# Patient Record
Sex: MALE | Race: WHITE | NOT HISPANIC OR LATINO | URBAN - METROPOLITAN AREA
[De-identification: names, ages, dates, MRNs, and addresses within clinical notes are randomized per-mention and may not be internally consistent; named-entity substitution may affect disease eponyms.]

---

## 2021-06-01 ENCOUNTER — INPATIENT (INPATIENT)
Facility: HOSPITAL | Age: 36
LOS: 9 days | Discharge: HOME | DRG: 177 | End: 2021-06-11
Attending: FAMILY MEDICINE | Admitting: INTERNAL MEDICINE
Payer: MEDICAID

## 2021-06-01 VITALS
HEART RATE: 115 BPM | WEIGHT: 315 LBS | OXYGEN SATURATION: 93 % | TEMPERATURE: 98 F | DIASTOLIC BLOOD PRESSURE: 100 MMHG | RESPIRATION RATE: 22 BRPM | HEIGHT: 70 IN | SYSTOLIC BLOOD PRESSURE: 159 MMHG

## 2021-06-01 DIAGNOSIS — U07.1 COVID-19: ICD-10-CM

## 2021-06-01 LAB
ALBUMIN SERPL ELPH-MCNC: 3.4 G/DL — SIGNIFICANT CHANGE UP (ref 3.3–5.2)
ALP SERPL-CCNC: 88 U/L — SIGNIFICANT CHANGE UP (ref 40–120)
ALT FLD-CCNC: 155 U/L — HIGH
ANION GAP SERPL CALC-SCNC: 13 MMOL/L — SIGNIFICANT CHANGE UP (ref 5–17)
APTT BLD: 26.7 SEC — LOW (ref 27.5–35.5)
AST SERPL-CCNC: 77 U/L — HIGH
BASOPHILS # BLD AUTO: 0.06 K/UL — SIGNIFICANT CHANGE UP (ref 0–0.2)
BASOPHILS NFR BLD AUTO: 0.6 % — SIGNIFICANT CHANGE UP (ref 0–2)
BILIRUB SERPL-MCNC: 1.3 MG/DL — SIGNIFICANT CHANGE UP (ref 0.4–2)
BUN SERPL-MCNC: 11.6 MG/DL — SIGNIFICANT CHANGE UP (ref 8–20)
CALCIUM SERPL-MCNC: 8.7 MG/DL — SIGNIFICANT CHANGE UP (ref 8.6–10.2)
CHLORIDE SERPL-SCNC: 89 MMOL/L — LOW (ref 98–107)
CO2 SERPL-SCNC: 25 MMOL/L — SIGNIFICANT CHANGE UP (ref 22–29)
CREAT SERPL-MCNC: 0.67 MG/DL — SIGNIFICANT CHANGE UP (ref 0.5–1.3)
CRP SERPL-MCNC: 57 MG/L — HIGH
EOSINOPHIL # BLD AUTO: 0.07 K/UL — SIGNIFICANT CHANGE UP (ref 0–0.5)
EOSINOPHIL NFR BLD AUTO: 0.8 % — SIGNIFICANT CHANGE UP (ref 0–6)
FERRITIN SERPL-MCNC: 1238 NG/ML — HIGH (ref 30–400)
GLUCOSE SERPL-MCNC: 110 MG/DL — HIGH (ref 70–99)
HCT VFR BLD CALC: 39.2 % — SIGNIFICANT CHANGE UP (ref 39–50)
HGB BLD-MCNC: 13.6 G/DL — SIGNIFICANT CHANGE UP (ref 13–17)
IMM GRANULOCYTES NFR BLD AUTO: 0.6 % — SIGNIFICANT CHANGE UP (ref 0–1.5)
INR BLD: 1.4 RATIO — HIGH (ref 0.88–1.16)
LYMPHOCYTES # BLD AUTO: 2.12 K/UL — SIGNIFICANT CHANGE UP (ref 1–3.3)
LYMPHOCYTES # BLD AUTO: 22.7 % — SIGNIFICANT CHANGE UP (ref 13–44)
MCHC RBC-ENTMCNC: 30.7 PG — SIGNIFICANT CHANGE UP (ref 27–34)
MCHC RBC-ENTMCNC: 34.7 GM/DL — SIGNIFICANT CHANGE UP (ref 32–36)
MCV RBC AUTO: 88.5 FL — SIGNIFICANT CHANGE UP (ref 80–100)
MONOCYTES # BLD AUTO: 1.14 K/UL — HIGH (ref 0–0.9)
MONOCYTES NFR BLD AUTO: 12.2 % — SIGNIFICANT CHANGE UP (ref 2–14)
NEUTROPHILS # BLD AUTO: 5.87 K/UL — SIGNIFICANT CHANGE UP (ref 1.8–7.4)
NEUTROPHILS NFR BLD AUTO: 63.1 % — SIGNIFICANT CHANGE UP (ref 43–77)
NT-PROBNP SERPL-SCNC: 74 PG/ML — SIGNIFICANT CHANGE UP (ref 0–300)
PLATELET # BLD AUTO: 605 K/UL — HIGH (ref 150–400)
POTASSIUM SERPL-MCNC: 3.9 MMOL/L — SIGNIFICANT CHANGE UP (ref 3.5–5.3)
POTASSIUM SERPL-SCNC: 3.9 MMOL/L — SIGNIFICANT CHANGE UP (ref 3.5–5.3)
PROCALCITONIN SERPL-MCNC: 0.14 NG/ML — HIGH (ref 0.02–0.1)
PROT SERPL-MCNC: 7.3 G/DL — SIGNIFICANT CHANGE UP (ref 6.6–8.7)
PROTHROM AB SERPL-ACNC: 16 SEC — HIGH (ref 10.6–13.6)
RBC # BLD: 4.43 M/UL — SIGNIFICANT CHANGE UP (ref 4.2–5.8)
RBC # FLD: 11.5 % — SIGNIFICANT CHANGE UP (ref 10.3–14.5)
SARS-COV-2 RNA SPEC QL NAA+PROBE: DETECTED
SODIUM SERPL-SCNC: 127 MMOL/L — LOW (ref 135–145)
TROPONIN T SERPL-MCNC: <0.01 NG/ML — SIGNIFICANT CHANGE UP (ref 0–0.06)
WBC # BLD: 9.32 K/UL — SIGNIFICANT CHANGE UP (ref 3.8–10.5)
WBC # FLD AUTO: 9.32 K/UL — SIGNIFICANT CHANGE UP (ref 3.8–10.5)

## 2021-06-01 PROCEDURE — 71045 X-RAY EXAM CHEST 1 VIEW: CPT | Mod: 26

## 2021-06-01 PROCEDURE — 99285 EMERGENCY DEPT VISIT HI MDM: CPT

## 2021-06-01 PROCEDURE — 71275 CT ANGIOGRAPHY CHEST: CPT | Mod: 26,MD

## 2021-06-01 PROCEDURE — 99223 1ST HOSP IP/OBS HIGH 75: CPT

## 2021-06-01 PROCEDURE — 93970 EXTREMITY STUDY: CPT | Mod: 26

## 2021-06-01 PROCEDURE — 93306 TTE W/DOPPLER COMPLETE: CPT | Mod: 26

## 2021-06-01 RX ORDER — ACETAMINOPHEN 500 MG
650 TABLET ORAL ONCE
Refills: 0 | Status: COMPLETED | OUTPATIENT
Start: 2021-06-01 | End: 2021-06-01

## 2021-06-01 RX ORDER — REMDESIVIR 5 MG/ML
100 INJECTION INTRAVENOUS EVERY 24 HOURS
Refills: 0 | Status: DISCONTINUED | OUTPATIENT
Start: 2021-06-02 | End: 2021-06-02

## 2021-06-01 RX ORDER — HYDRALAZINE HCL 50 MG
10 TABLET ORAL ONCE
Refills: 0 | Status: COMPLETED | OUTPATIENT
Start: 2021-06-01 | End: 2021-06-01

## 2021-06-01 RX ORDER — SODIUM CHLORIDE 9 MG/ML
500 INJECTION INTRAMUSCULAR; INTRAVENOUS; SUBCUTANEOUS ONCE
Refills: 0 | Status: COMPLETED | OUTPATIENT
Start: 2021-06-01 | End: 2021-06-01

## 2021-06-01 RX ORDER — DEXAMETHASONE 0.5 MG/5ML
6 ELIXIR ORAL ONCE
Refills: 0 | Status: COMPLETED | OUTPATIENT
Start: 2021-06-01 | End: 2021-06-01

## 2021-06-01 RX ORDER — HEPARIN SODIUM 5000 [USP'U]/ML
10000 INJECTION INTRAVENOUS; SUBCUTANEOUS EVERY 6 HOURS
Refills: 0 | Status: DISCONTINUED | OUTPATIENT
Start: 2021-06-01 | End: 2021-06-03

## 2021-06-01 RX ORDER — HEPARIN SODIUM 5000 [USP'U]/ML
5000 INJECTION INTRAVENOUS; SUBCUTANEOUS EVERY 6 HOURS
Refills: 0 | Status: DISCONTINUED | OUTPATIENT
Start: 2021-06-01 | End: 2021-06-03

## 2021-06-01 RX ORDER — HEPARIN SODIUM 5000 [USP'U]/ML
10000 INJECTION INTRAVENOUS; SUBCUTANEOUS ONCE
Refills: 0 | Status: COMPLETED | OUTPATIENT
Start: 2021-06-01 | End: 2021-06-01

## 2021-06-01 RX ORDER — AMLODIPINE BESYLATE 2.5 MG/1
10 TABLET ORAL DAILY
Refills: 0 | Status: DISCONTINUED | OUTPATIENT
Start: 2021-06-01 | End: 2021-06-03

## 2021-06-01 RX ORDER — HEPARIN SODIUM 5000 [USP'U]/ML
INJECTION INTRAVENOUS; SUBCUTANEOUS
Qty: 25000 | Refills: 0 | Status: DISCONTINUED | OUTPATIENT
Start: 2021-06-01 | End: 2021-06-03

## 2021-06-01 RX ORDER — DEXAMETHASONE 0.5 MG/5ML
6 ELIXIR ORAL DAILY
Refills: 0 | Status: COMPLETED | OUTPATIENT
Start: 2021-06-02 | End: 2021-06-11

## 2021-06-01 RX ORDER — AMLODIPINE BESYLATE 2.5 MG/1
5 TABLET ORAL DAILY
Refills: 0 | Status: DISCONTINUED | OUTPATIENT
Start: 2021-06-01 | End: 2021-06-01

## 2021-06-01 RX ORDER — REMDESIVIR 5 MG/ML
200 INJECTION INTRAVENOUS EVERY 24 HOURS
Refills: 0 | Status: COMPLETED | OUTPATIENT
Start: 2021-06-01 | End: 2021-06-01

## 2021-06-01 RX ORDER — ACETAMINOPHEN 500 MG
650 TABLET ORAL EVERY 4 HOURS
Refills: 0 | Status: DISCONTINUED | OUTPATIENT
Start: 2021-06-01 | End: 2021-06-11

## 2021-06-01 RX ORDER — ALBUTEROL 90 UG/1
2 AEROSOL, METERED ORAL EVERY 6 HOURS
Refills: 0 | Status: DISCONTINUED | OUTPATIENT
Start: 2021-06-01 | End: 2021-06-11

## 2021-06-01 RX ORDER — REMDESIVIR 5 MG/ML
INJECTION INTRAVENOUS
Refills: 0 | Status: DISCONTINUED | OUTPATIENT
Start: 2021-06-01 | End: 2021-06-02

## 2021-06-01 RX ADMIN — SODIUM CHLORIDE 500 MILLILITER(S): 9 INJECTION INTRAMUSCULAR; INTRAVENOUS; SUBCUTANEOUS at 08:54

## 2021-06-01 RX ADMIN — SODIUM CHLORIDE 500 MILLILITER(S): 9 INJECTION INTRAMUSCULAR; INTRAVENOUS; SUBCUTANEOUS at 10:20

## 2021-06-01 RX ADMIN — HEPARIN SODIUM 2400 UNIT(S)/HR: 5000 INJECTION INTRAVENOUS; SUBCUTANEOUS at 15:18

## 2021-06-01 RX ADMIN — Medication 6 MILLIGRAM(S): at 13:05

## 2021-06-01 RX ADMIN — AMLODIPINE BESYLATE 5 MILLIGRAM(S): 2.5 TABLET ORAL at 15:18

## 2021-06-01 RX ADMIN — HEPARIN SODIUM 10000 UNIT(S): 5000 INJECTION INTRAVENOUS; SUBCUTANEOUS at 15:17

## 2021-06-01 RX ADMIN — Medication 650 MILLIGRAM(S): at 09:19

## 2021-06-01 RX ADMIN — ALBUTEROL 2 PUFF(S): 90 AEROSOL, METERED ORAL at 18:32

## 2021-06-01 RX ADMIN — AMLODIPINE BESYLATE 10 MILLIGRAM(S): 2.5 TABLET ORAL at 17:36

## 2021-06-01 RX ADMIN — Medication 650 MILLIGRAM(S): at 13:00

## 2021-06-01 NOTE — ED ADULT NURSE NOTE - CHIEF COMPLAINT QUOTE
pt c/o shortness of breath MARIN x 3weeks increasing in severity over the past three weeks.  pt states subjective fever took Motrin with no relief.

## 2021-06-01 NOTE — H&P ADULT - HISTORY OF PRESENT ILLNESS
34yo M w/pmh HTN presents for SOB x3 weeks associated with SOB worse w/ exertion assoc/w productive cough, chest heaviness, generalized weakness with subjective fevers. Denies n/v, abdominal pain. Reports self-isolating since then. Denies known COVID contacts however 4 days prior to symptoms pt went to a restaurant for dinner and says it was busy. In ED found ot have COVID PNA and Left Lower Lobe PE on CT.

## 2021-06-01 NOTE — ED PROVIDER NOTE - OBJECTIVE STATEMENT
34yo M w/pmh HTN presents for SOB x3w. Reports SOB worse w/ exertion assoc/w productive cough, chest heaviness, generalized weakness for 3 weeks, assoc/w subjective fever for the first 4 days. Denies n/v, abdominal pain. Reports self-isolating since then. Denies known COVID contacts. Not COVID vaccinated. Vapes occasionally.

## 2021-06-01 NOTE — CONSULT NOTE ADULT - TIME BILLING
Review of hospital charts, including PACS and labs, and office and outside records if applicable.  Discussion of plans with referring service, and adjusting appropriate antibiotics.    d/w medical team

## 2021-06-01 NOTE — H&P ADULT - ASSESSMENT
36yo M w/pmh HTN presents for SOB x3 weeks associated with SOB worse w/ exertion assoc/w productive cough, chest heaviness, generalized weakness with subjective fevers. Denies n/v, abdominal pain. Reports self-isolating since then. Denies known COVID contacts however 4 days prior to symptoms pt went to a restaurant for dinner and says it was busy. In ED found ot have COVID PNA and Left Lower Lobe PE on CT.      1) COVID 19 PNA with   - admit to medicine  - tele/pulse ox  - O2, albuterol inhaler  - dexamethasone 6mg IV daily  - remdesivir ordered  - ID consulted    2) Acute Left Lower Lobe Pulmonary Embolism  - heparin drip started  - B/L LE u/s ordered  - change to po A/C prior to discharge  - 2Decho ordred    3) HTN  - start Norvasc 5mg daily   - monitor BP    4) Prophylactic measure  - DVT ppx: already on heparin drip

## 2021-06-01 NOTE — H&P ADULT - NSHPSOCIALHISTORY_GEN_ALL_CORE
Pt vapes, has not for past 3 weeks  drinks a bottle of wine daily however has not drank any alcohol for 3 weeks  Denies any drug abuse.

## 2021-06-01 NOTE — ED PROVIDER NOTE - NS ED ROS FT
Constitutional: +fever, no sweats, and no chills.  CV: no chest pain, no edema.  Resp: + cough, +dyspnea  GI: no abdominal pain, no nausea and no vomiting.  MSK: no msk pain, no weakness  Skin: no lesions, and no rashes.  Neuro: no LOC, no headache, no sensory deficits, and no dizziness  ROS otherwise negative except as noted in HPI.

## 2021-06-01 NOTE — H&P ADULT - NSHPLABSRESULTS_GEN_ALL_CORE
13.6   9.32  )-----------( 605      ( 01 Jun 2021 08:57 )             39.2       06-01    127<L>  |  89<L>  |  11.6  ----------------------------<  110<H>  3.9   |  25.0  |  0.67    Ca    8.7      01 Jun 2021 08:57    TPro  7.3  /  Alb  3.4  /  TBili  1.3  /  DBili  x   /  AST  77<H>  /  ALT  155<H>  /  AlkPhos  88  06-01      < from: CT Angio Chest PE Protocol w/ IV Cont (06.01.21 @ 12:45) >     EXAM:  CT ANGIO CHEST PULM [facility]                          PROCEDURE DATE:  06/01/2021          INTERPRETATION:  CLINICAL INFORMATION: Covid. Tachycardia. Hypoxia.    COMPARISON: Frontal chest radiograph performed the same day.    CONTRAST/COMPLICATIONS:  IV Contrast: Omnipaque 350  60 cc administered   40 cc discarded  Oral Contrast: NONE  Complications: None reported at time of study completion    PROCEDURE:  CT Angiography of the Chest.  Sagittal and coronal reformats were performed as well as 3D (MIP) reconstructions.    FINDINGS:    LUNGS AND AIRWAYS: Patent central airways.  There are extensive coarse consolidative opacities throughout the lungs, most prominent in the lower lobes, compatible with history of Covid pneumonia.  PLEURA: No pleural effusion. No pneumothorax or pneumomediastinum.  MEDIASTINUM AND LASHAE: There are prominent mediastinal and bilateral hilar lymph nodes. There is no axillary lymphadenopathy.  VESSELS: There is a tubular filling defect within a medial branch of the posterior basilar segment artery of the left lower lobe, compatible with pulmonary embolism. No additional filling defect is identified within the first, second, or third order branches of the pulmonary arteries. The pulmonary trunk and main pulmonary arteries are unremarkable. The thoracic aorta and great vessels are unremarkable.  HEART: Heart size is normal. No pericardial effusion.  CHEST WALL AND LOWER NECK: Within normal limits.  VISUALIZED UPPER ABDOMEN: Within normal limits.  BONES: Within normal limits.    IMPRESSION:  1. Segmental pulmonary embolism in the left lower lobe.  2. Extensive coarse consolidative opacities with prominent mediastinal and bilateral hilar lymph nodes, compatible with Covid pneumonia.  3. Results discussed with Dr. Marsh at time of interpretation.            ARJUN CHAMBERLAIN M.D., ATTENDING RADIOLOGIST  This document has been electronically signed. Jun 1 2021 12:53PM    < end of copied text >

## 2021-06-01 NOTE — ED PROVIDER NOTE - NS ED ATTENDING STATEMENT MOD
Spoke with patient, informed him medication was refilled but it is not due to be filled until Friday. Patient stating that he ran out of medication today. Informed him that script was for 1 every 8hrs as needed and a 10 day script. Patient stating that he was taking 2 tablets in the morning and 2 at night. He states that he cannot wait until Friday to  script since he is all out and in pain. He states that he cannot stand for less than 5 minutes without having pain. He states his left knee is giving him problems due to compensating for right leg. He ask that medication frequency be changed to 2 every 6hrs. Informed him that message will be sent to MD/PA and will follow up with him. Patient verbalized understanding.     Attending with

## 2021-06-01 NOTE — CONSULT NOTE ADULT - ASSESSMENT
This 36yo M w/pmh HTN presents for SOB x3 weeks associated with SOB worse w/ exertion assoc/w productive cough, chest heaviness, generalized weakness with subjective fevers. Denies n/v, abdominal pain. Reports self-isolating since then. Denies known COVID contacts however 4 days prior to symptoms pt went to a restaurant for dinner and says it was busy. In ED found ot have COVID PNA and Left Lower Lobe PE on CT. (01 Jun 2021 13:55)    He lives alone. Last trip outside was for mother's day celebration with family., crowded restaurant.  All other family members were vaccinated.   he got sick.   now here.   CT scan reviewed.  LLL Subsegmental PE.    Covid testing is positive here.   dexamethasone given by ER.    Remdesivir ordered by medical team.       Impression:  COVID-19 infection   Viral pneumonia  shortness of breath  lung infiltrates  dependence on oxygen  LLL Subsegmental PE    Plan:  - start Remdesivir IV daily.  will plan for a 5 day course.   - MAY stop earlier than 5 days, and send home, if patient is back on Ambient oxygen/ room air.     - GIVEN duration of his symptoms, patient no longer candidate for Actemra    - continue dexamethasone 6mg daily, While on remdesivir  trend Inflammatory markers and LFTs   - trend CBC with diff, CMP  daily    - Continue supportive care measures  - continue Oxygenation as needed;  CONTINUE to titrate down as tolerated  - self- proning  as tolerated  - ENCOURAGED OOB to chair  - encouraged incentive spirometry     Anticoagulation as per medical team  - pending lower ext DVT study as well.       Will follow with you.

## 2021-06-01 NOTE — ED PROVIDER NOTE - PHYSICAL EXAMINATION
General: well appearing, NAD  Head: NC/AT  Cardiac: RRR, no m/r/g  Respiratory: CTABL, equal chest wall expansions, satting 94% on RA at rest, 90-91% on RA w/ ambulation  Abdomen: soft, ND, NT  Neuro: AAOx3,coordinated movement of all 4 extremities  Psych: calm, cooperative, normal affect  Skin: warm and dry

## 2021-06-01 NOTE — ED ADULT TRIAGE NOTE - CHIEF COMPLAINT QUOTE
pt c/o shortness of breath MARIN x 3weeks increasing in severity over the past three weeks.  pt states subjective fever took Motrin with no relief.
normal...

## 2021-06-01 NOTE — ED ADULT NURSE NOTE - OBJECTIVE STATEMENT
35y male AOx4 c/o increased SOB and MARIN x 3 weeks, weakness, cough and phlegm production. O2 sat 93% on room air. RR elevated, HR elevated approx 110bpm. respirations even and unlabored, denies chest discomfort. abd soft and nondistended. skin WNL for race. pt denies sick contacts. pt denies smoking cigarettes, endorses smoking a vape.

## 2021-06-01 NOTE — ED PROVIDER NOTE - ATTENDING CONTRIBUTION TO CARE
36yo M w/pmh HTN presents for SOB x3w. Started with fever that went away after first 4 days now with persistent sob x 2.5 weeks. Vapes occasionally. nOt covid vaccinated, has been self isolating for last few weeks. Feels too sob to even walk upstairs to his home  AP - tachycardic and dyspneic. possible covid, will get labs, inflammatory markers. CTA. reassess

## 2021-06-01 NOTE — CONSULT NOTE ADULT - SUBJECTIVE AND OBJECTIVE BOX
[facility]  INFECTIOUS DISEASES AND INTERNAL MEDICINE at [place]  =======================================================  Jhonathan Roth MD  Diplomates American Board of Internal Medicine and Infectious Diseases  Tel  [phone]  Fax [phone]  =======================================================    [id number]  JANIE HANEY   HPI: This 34yo M w/pmh HTN presents for SOB x3 weeks associated with SOB worse w/ exertion assoc/w productive cough, chest heaviness, generalized weakness with subjective fevers. Denies n/v, abdominal pain. Reports self-isolating since then. Denies known COVID contacts however 4 days prior to symptoms pt went to a restaurant for dinner and says it was busy. In ED found ot have COVID PNA and Left Lower Lobe PE on CT. (01 Jun 2021 13:55)    He lives alone. Last trip outside was for mother's day celebration with family., crowded restaurant.  All other family members were vaccinated.   he got sick.   now here.   CT scan reviewed.  LLL Subsegmental PE.    Covid testing is positive here.   dexamethasone given by ER.    Remdesivir ordered by medical team.       I have personally reviewed the labs and data; pertinent labs and data are listed in this note; please see below.   =======================================================  Past Medical & Surgical Hx:  =====================  PAST MEDICAL & SURGICAL HISTORY:  No pertinent past medical history  No significant past surgical history      Problem List:  ==========  HEALTH ISSUES - PROBLEM Dx:        Social Hx:  =======  no toxic habits currently    FAMILY HISTORY:  FH: lymphoma (Father)    no significant family history of immunosuppressive disorders in mother or father   =======================================================    REVIEW OF SYSTEMS:  CONSTITUTIONAL:  as per HPI  HEENT:  No diplopia or blurred vision.  No earache, sore throat or runny nose.  CARDIOVASCULAR:  No pressure, squeezing, strangling, tightness, heaviness or aching about the chest, neck, axilla or epigastrium.  RESPIRATORY:  as per HPI  GASTROINTESTINAL:  No nausea, vomiting or diarrhea.  GENITOURINARY:  No dysuria, frequency or urgency. No Blood in urine  MUSCULOSKELETAL:  no joint aches, no muscle pain  SKIN:  No change in skin, hair or nails.  NEUROLOGIC:  No Headaches, seizures or weakness.  PSYCHIATRIC:  No disorder of thought or mood.  ENDOCRINE:  No heat or cold intolerance  HEMATOLOGICAL:  No easy bruising or bleeding.    =======================================================  Allergies    No Known Allergies    Intolerances    Antibiotics:  remdesivir  IVPB 200 milliGRAM(s) IV Intermittent every 24 hours    Other medications:  ALBUTerol    90 MICROgram(s) HFA Inhaler 2 Puff(s) Inhalation every 6 hours  amLODIPine   Tablet 5 milliGRAM(s) Oral daily  heparin   Injectable 24315 Unit(s) IV Push once  heparin  Infusion.  Unit(s)/Hr IV Continuous <Continuous>       ======================================================  Physical Exam:  ============  T(F): 98.5 (01 Jun 2021 12:11), Max: 98.5 (01 Jun 2021 12:11)  HR: 98 (01 Jun 2021 12:11)  BP: 180/89 (01 Jun 2021 12:11)  RR: 22 (01 Jun 2021 12:11)  SpO2: 99% (01 Jun 2021 12:11) (93% - 99%)  temp max in last 48H T(F): , Max: 98.5 (06-01-21 @ 12:11)Height (cm): 177.8 (06-01-21 @ 07:39)  Weight (kg): 131.5 (06-01-21 @ 13:18)  BMI (kg/m2): 41.6 (06-01-21 @ 13:18)  BSA (m2): 2.44 (06-01-21 @ 13:18)    General:  No acute distress. OBESE  Eye: Pupils are equal, round and reactive to light, Extraocular movements are intact, Normal conjunctiva.  HENT: Normocephalic, Oral mucosa is moist, No pharyngeal erythema, No sinus tenderness.  Neck: Supple, No lymphadenopathy.  Respiratory: Lungs with coarse breath sounds  Cardiovascular: mild tachycardia  Gastrointestinal: Soft, Non-tender, Non-distended, Normal bowel sounds.  Genitourinary: No costovertebral angle tenderness.  Lymphatics: No lymphadenopathy neck,   Musculoskeletal: Normal range of motion, Normal strength.  Integumentary: No rash.  Neurologic: Alert, Oriented, No focal deficits, Cranial Nerves II-XII are grossly intact.  Psychiatric: Appropriate mood & affect.    =======================================================  Labs:                        13.6   9.32  )-----------( 605      ( 01 Jun 2021 08:57 )             39.2     06-01    127<L>  |  89<L>  |  11.6  ----------------------------<  110<H>  3.9   |  25.0  |  0.67    Ca    8.7      01 Jun 2021 08:57    TPro  7.3  /  Alb  3.4  /  TBili  1.3  /  DBili  x   /  AST  77<H>  /  ALT  155<H>  /  AlkPhos  88  06-01      Creatinine, Serum: 0.67 mg/dL (06-01-21 @ 08:57)    C-Reactive Protein, Serum: 57 mg/L (06-01-21 @ 08:57)      Procalcitonin, Serum: 0.14 ng/mL (06-01-21 @ 08:57)      COVID-19 PCR: Detected (06-01-21 @ 08:59)       < from: CT Angio Chest PE Protocol w/ IV Cont (06.01.21 @ 12:45) >     EXAM:  CT ANGIO CHEST PULM [facility]                          PROCEDURE DATE:  06/01/2021          INTERPRETATION:  CLINICAL INFORMATION: Covid. Tachycardia. Hypoxia.    COMPARISON: Frontal chest radiograph performed the same day.    CONTRAST/COMPLICATIONS:  IV Contrast: Omnipaque 350  60 cc administered   40 cc discarded  Oral Contrast: NONE  Complications: None reported at time of study completion    PROCEDURE:  CT Angiography of the Chest.  Sagittal and coronal reformats were performed as well as 3D (MIP) reconstructions.    FINDINGS:    LUNGS AND AIRWAYS: Patent central airways.  There are extensive coarse consolidative opacities throughout the lungs, most prominent in the lower lobes, compatible with history of Covid pneumonia.  PLEURA: No pleural effusion. No pneumothorax or pneumomediastinum.  MEDIASTINUM AND LASHAE: There are prominent mediastinal and bilateral hilar lymph nodes. There is no axillary lymphadenopathy.  VESSELS: There is a tubular filling defect within a medial branch of the posterior basilar segment artery of the left lower lobe, compatible with pulmonary embolism. No additional filling defect is identified within the first, second, or third order branches of the pulmonary arteries. The pulmonary trunk and main pulmonary arteries are unremarkable. The thoracic aorta and great vessels are unremarkable.  HEART: Heart size is normal. No pericardial effusion.  CHEST WALL AND LOWER NECK: Within normal limits.  VISUALIZED UPPER ABDOMEN: Within normal limits.  BONES: Within normal limits.    IMPRESSION:  1. Segmental pulmonary embolism in the left lower lobe.  2. Extensive coarse consolidative opacities with prominent mediastinal and bilateral hilar lymph nodes, compatible with Covid pneumonia.  3. Results discussed with Dr. Marsh at time of interpretation.         RAJUN CHAMBERLAIN M.D., ATTENDING RADIOLOGIST  This document has been electronically signed. Jun 1 2021 12:53PM    < end of copied text >

## 2021-06-01 NOTE — ED PROVIDER NOTE - CLINICAL SUMMARY MEDICAL DECISION MAKING FREE TEXT BOX
34yo M w/pmh HTN presents for SOB, hx/o fever, generalized weakness. Tachycardic to 115 in ER, satting 94% on RA at rest, 90-91% on RA w/ ambulation. Labs, COVID, CTA chest pending.

## 2021-06-01 NOTE — ED PROVIDER NOTE - CARE PLAN
Principal Discharge DX:	Acute hypoxemic respiratory failure due to COVID-19  Secondary Diagnosis:	Pulmonary embolism, unspecified chronicity, unspecified pulmonary embolism type, unspecified whether acute cor pulmonale present

## 2021-06-01 NOTE — ED PROVIDER NOTE - PROGRESS NOTE DETAILS
found to have COVID19. pending CTA r/o PE patient hypoxic to 92% while at rest. found to have segmental PE. willl need admission. d/w with Dr. Mathews (medicine) will start heparin and admit

## 2021-06-01 NOTE — ED ADULT NURSE NOTE - NSIMPLEMENTINTERV_GEN_ALL_ED
Implemented All Universal Safety Interventions:  [place] to call system. Call bell, personal items and telephone within reach. Instruct patient to call for assistance. Room bathroom lighting operational. Non-slip footwear when patient is off stretcher. Physically safe environment: no spills, clutter or unnecessary equipment. Stretcher in lowest position, wheels locked, appropriate side rails in place.

## 2021-06-02 LAB
A1C WITH ESTIMATED AVERAGE GLUCOSE RESULT: 5.9 % — HIGH (ref 4–5.6)
ALBUMIN SERPL ELPH-MCNC: 3.4 G/DL — SIGNIFICANT CHANGE UP (ref 3.3–5.2)
ALBUMIN SERPL ELPH-MCNC: 3.4 G/DL — SIGNIFICANT CHANGE UP (ref 3.3–5.2)
ALBUMIN SERPL ELPH-MCNC: 3.5 G/DL — SIGNIFICANT CHANGE UP (ref 3.3–5.2)
ALP SERPL-CCNC: 83 U/L — SIGNIFICANT CHANGE UP (ref 40–120)
ALP SERPL-CCNC: 85 U/L — SIGNIFICANT CHANGE UP (ref 40–120)
ALP SERPL-CCNC: 86 U/L — SIGNIFICANT CHANGE UP (ref 40–120)
ALT FLD-CCNC: 165 U/L — HIGH
ANION GAP SERPL CALC-SCNC: 14 MMOL/L — SIGNIFICANT CHANGE UP (ref 5–17)
APTT BLD: 103.3 SEC — HIGH (ref 27.5–35.5)
APTT BLD: 47.1 SEC — HIGH (ref 27.5–35.5)
APTT BLD: 56 SEC — HIGH (ref 27.5–35.5)
AST SERPL-CCNC: 75 U/L — HIGH
AST SERPL-CCNC: 77 U/L — HIGH
AST SERPL-CCNC: 79 U/L — HIGH
BILIRUB DIRECT SERPL-MCNC: 0.1 MG/DL — SIGNIFICANT CHANGE UP (ref 0–0.3)
BILIRUB DIRECT SERPL-MCNC: 0.3 MG/DL — SIGNIFICANT CHANGE UP (ref 0–0.3)
BILIRUB INDIRECT FLD-MCNC: 0.6 MG/DL — SIGNIFICANT CHANGE UP (ref 0.2–1)
BILIRUB INDIRECT FLD-MCNC: 0.7 MG/DL — SIGNIFICANT CHANGE UP (ref 0.2–1)
BILIRUB SERPL-MCNC: 0.8 MG/DL — SIGNIFICANT CHANGE UP (ref 0.4–2)
BUN SERPL-MCNC: 9.3 MG/DL — SIGNIFICANT CHANGE UP (ref 8–20)
CALCIUM SERPL-MCNC: 9.1 MG/DL — SIGNIFICANT CHANGE UP (ref 8.6–10.2)
CHLORIDE SERPL-SCNC: 95 MMOL/L — LOW (ref 98–107)
CO2 SERPL-SCNC: 27 MMOL/L — SIGNIFICANT CHANGE UP (ref 22–29)
COVID-19 SPIKE DOMAIN AB INTERP: POSITIVE
COVID-19 SPIKE DOMAIN ANTIBODY RESULT: 212 U/ML — HIGH
CREAT SERPL-MCNC: 0.66 MG/DL — SIGNIFICANT CHANGE UP (ref 0.5–1.3)
CREAT SERPL-MCNC: 0.72 MG/DL — SIGNIFICANT CHANGE UP (ref 0.5–1.3)
CREAT SERPL-MCNC: 0.77 MG/DL — SIGNIFICANT CHANGE UP (ref 0.5–1.3)
ESTIMATED AVERAGE GLUCOSE: 123 MG/DL — HIGH (ref 68–114)
GLUCOSE SERPL-MCNC: 102 MG/DL — HIGH (ref 70–99)
HCT VFR BLD CALC: 39 % — SIGNIFICANT CHANGE UP (ref 39–50)
HCT VFR BLD CALC: 40.5 % — SIGNIFICANT CHANGE UP (ref 39–50)
HGB BLD-MCNC: 13 G/DL — SIGNIFICANT CHANGE UP (ref 13–17)
HGB BLD-MCNC: 13.3 G/DL — SIGNIFICANT CHANGE UP (ref 13–17)
INR BLD: 1.26 RATIO — HIGH (ref 0.88–1.16)
MAGNESIUM SERPL-MCNC: 2.3 MG/DL — SIGNIFICANT CHANGE UP (ref 1.6–2.6)
MCHC RBC-ENTMCNC: 30 PG — SIGNIFICANT CHANGE UP (ref 27–34)
MCHC RBC-ENTMCNC: 30.6 PG — SIGNIFICANT CHANGE UP (ref 27–34)
MCHC RBC-ENTMCNC: 32.8 GM/DL — SIGNIFICANT CHANGE UP (ref 32–36)
MCHC RBC-ENTMCNC: 33.3 GM/DL — SIGNIFICANT CHANGE UP (ref 32–36)
MCV RBC AUTO: 90.1 FL — SIGNIFICANT CHANGE UP (ref 80–100)
MCV RBC AUTO: 93.1 FL — SIGNIFICANT CHANGE UP (ref 80–100)
PLATELET # BLD AUTO: 564 K/UL — HIGH (ref 150–400)
PLATELET # BLD AUTO: 578 K/UL — HIGH (ref 150–400)
POTASSIUM SERPL-MCNC: 3.7 MMOL/L — SIGNIFICANT CHANGE UP (ref 3.5–5.3)
POTASSIUM SERPL-SCNC: 3.7 MMOL/L — SIGNIFICANT CHANGE UP (ref 3.5–5.3)
PROT SERPL-MCNC: 7.1 G/DL — SIGNIFICANT CHANGE UP (ref 6.6–8.7)
PROT SERPL-MCNC: 7.5 G/DL — SIGNIFICANT CHANGE UP (ref 6.6–8.7)
PROT SERPL-MCNC: 7.5 G/DL — SIGNIFICANT CHANGE UP (ref 6.6–8.7)
PROTHROM AB SERPL-ACNC: 14.5 SEC — HIGH (ref 10.6–13.6)
RBC # BLD: 4.33 M/UL — SIGNIFICANT CHANGE UP (ref 4.2–5.8)
RBC # BLD: 4.35 M/UL — SIGNIFICANT CHANGE UP (ref 4.2–5.8)
RBC # FLD: 11.6 % — SIGNIFICANT CHANGE UP (ref 10.3–14.5)
RBC # FLD: 11.8 % — SIGNIFICANT CHANGE UP (ref 10.3–14.5)
SARS-COV-2 IGG+IGM SERPL QL IA: 212 U/ML — HIGH
SARS-COV-2 IGG+IGM SERPL QL IA: POSITIVE
SODIUM SERPL-SCNC: 135 MMOL/L — SIGNIFICANT CHANGE UP (ref 135–145)
WBC # BLD: 8.2 K/UL — SIGNIFICANT CHANGE UP (ref 3.8–10.5)
WBC # BLD: 9.08 K/UL — SIGNIFICANT CHANGE UP (ref 3.8–10.5)
WBC # FLD AUTO: 8.2 K/UL — SIGNIFICANT CHANGE UP (ref 3.8–10.5)
WBC # FLD AUTO: 9.08 K/UL — SIGNIFICANT CHANGE UP (ref 3.8–10.5)

## 2021-06-02 PROCEDURE — 99232 SBSQ HOSP IP/OBS MODERATE 35: CPT

## 2021-06-02 PROCEDURE — 99233 SBSQ HOSP IP/OBS HIGH 50: CPT

## 2021-06-02 RX ORDER — REMDESIVIR 5 MG/ML
100 INJECTION INTRAVENOUS EVERY 24 HOURS
Refills: 0 | Status: DISCONTINUED | OUTPATIENT
Start: 2021-06-03 | End: 2021-06-05

## 2021-06-02 RX ORDER — REMDESIVIR 5 MG/ML
INJECTION INTRAVENOUS
Refills: 0 | Status: DISCONTINUED | OUTPATIENT
Start: 2021-06-02 | End: 2021-06-05

## 2021-06-02 RX ORDER — REMDESIVIR 5 MG/ML
200 INJECTION INTRAVENOUS EVERY 24 HOURS
Refills: 0 | Status: COMPLETED | OUTPATIENT
Start: 2021-06-02 | End: 2021-06-02

## 2021-06-02 RX ADMIN — HEPARIN SODIUM 2700 UNIT(S)/HR: 5000 INJECTION INTRAVENOUS; SUBCUTANEOUS at 01:51

## 2021-06-02 RX ADMIN — Medication 2 MILLIGRAM(S): at 17:30

## 2021-06-02 RX ADMIN — ALBUTEROL 2 PUFF(S): 90 AEROSOL, METERED ORAL at 22:09

## 2021-06-02 RX ADMIN — HEPARIN SODIUM 2700 UNIT(S)/HR: 5000 INJECTION INTRAVENOUS; SUBCUTANEOUS at 16:02

## 2021-06-02 RX ADMIN — Medication 6 MILLIGRAM(S): at 05:27

## 2021-06-02 RX ADMIN — AMLODIPINE BESYLATE 10 MILLIGRAM(S): 2.5 TABLET ORAL at 05:27

## 2021-06-02 RX ADMIN — HEPARIN SODIUM 5000 UNIT(S): 5000 INJECTION INTRAVENOUS; SUBCUTANEOUS at 01:52

## 2021-06-02 RX ADMIN — ALBUTEROL 2 PUFF(S): 90 AEROSOL, METERED ORAL at 00:27

## 2021-06-02 RX ADMIN — ALBUTEROL 2 PUFF(S): 90 AEROSOL, METERED ORAL at 05:28

## 2021-06-02 RX ADMIN — ALBUTEROL 2 PUFF(S): 90 AEROSOL, METERED ORAL at 13:06

## 2021-06-02 RX ADMIN — REMDESIVIR 200 MILLIGRAM(S): 5 INJECTION INTRAVENOUS at 15:55

## 2021-06-02 RX ADMIN — HEPARIN SODIUM 5000 UNIT(S): 5000 INJECTION INTRAVENOUS; SUBCUTANEOUS at 08:29

## 2021-06-02 RX ADMIN — HEPARIN SODIUM 3000 UNIT(S)/HR: 5000 INJECTION INTRAVENOUS; SUBCUTANEOUS at 08:29

## 2021-06-02 NOTE — PROCEDURE NOTE - NSANATOMICLLOCATION_GEN_A_CORE
Large Joint Injection/Arthocentesis: R knee joint  Date/Time: 1/14/2020 11:57 AM  Performed by: Rex Woods MD  Authorized by: Rex Woods MD     Indications:  Pain  Needle Size:  22 G  Guidance: landmark guided    Approach:  Medial  Location:  Knee      Medications:  80 mg methylPREDNISolone 80 MG/ML; 4 mL lidocaine 1 %  Consent Given by:  Patient  Timeout: timeout called immediately prior to procedure    Prep: patient was prepped and draped in usual sterile fashion           right/forearm

## 2021-06-02 NOTE — PROGRESS NOTE ADULT - SUBJECTIVE AND OBJECTIVE BOX
[facility]  INFECTIOUS DISEASES AND INTERNAL MEDICINE at [place]  =======================================================  Jhonathan Roth MD  Diplomates American Board of Internal Medicine and Infectious Diseases  Tel  [phone]  Fax [phone]  =======================================================    [id number]  JANIE HANEY   follow up:  COVID-19 pneumonia, PE    still on NC  sats of 91 percent    I have personally reviewed the labs and data; pertinent labs and data are listed in this note; please see below.   =======================================================  Past Medical & Surgical Hx:  =====================  PAST MEDICAL & SURGICAL HISTORY:  No pertinent past medical history  No significant past surgical history      Problem List:  ==========  HEALTH ISSUES - PROBLEM Dx:        Social Hx:  =======  no toxic habits currently    FAMILY HISTORY:  FH: lymphoma (Father)    no significant family history of immunosuppressive disorders in mother or father   =======================================================    REVIEW OF SYSTEMS:  CONSTITUTIONAL:  as per HPI  HEENT:  No diplopia or blurred vision.  No earache, sore throat or runny nose.  CARDIOVASCULAR:  No pressure, squeezing, strangling, tightness, heaviness or aching about the chest, neck, axilla or epigastrium.  RESPIRATORY:  as per HPI  GASTROINTESTINAL:  No nausea, vomiting or diarrhea.  GENITOURINARY:  No dysuria, frequency or urgency. No Blood in urine  MUSCULOSKELETAL:  no joint aches, no muscle pain  SKIN:  No change in skin, hair or nails.  NEUROLOGIC:  No Headaches, seizures or weakness.  PSYCHIATRIC:  No disorder of thought or mood.  ENDOCRINE:  No heat or cold intolerance  HEMATOLOGICAL:  No easy bruising or bleeding.    =======================================================  Allergies  No Known Allergies     ======================================================  Physical Exam:  ============     General:  No acute distress. OBESE  Eye: Pupils are equal, round and reactive to light, Extraocular movements are intact, Normal conjunctiva.  HENT: Normocephalic, Oral mucosa is moist, No pharyngeal erythema, No sinus tenderness.  Neck: Supple, No lymphadenopathy.  Respiratory: Lungs with coarse breath sounds  Cardiovascular: mild tachycardia  Gastrointestinal: Soft, Non-tender, Non-distended, Normal bowel sounds.  Genitourinary: No costovertebral angle tenderness.  Lymphatics: No lymphadenopathy neck,   Musculoskeletal: Normal range of motion, Normal strength.  Integumentary: No rash.  Neurologic: Alert, Oriented, No focal deficits, Cranial Nerves II-XII are grossly intact.  Psychiatric: Appropriate mood & affect.    =======================================================  Vitals:  ============  T(F): 97.6 (02 Jun 2021 09:02), Max: 99.1 (01 Jun 2021 16:50)  HR: 97 (02 Jun 2021 09:02)  BP: 136/79 (02 Jun 2021 09:02)  RR: 18 (02 Jun 2021 09:02)  SpO2: 92% (02 Jun 2021 09:02) (92% - 99%)  temp max in last 48H T(F): , Max: 99.1 (06-01-21 @ 16:50)    =======================================================  Current Antibiotics:  remdesivir  IVPB 100 milliGRAM(s) IV Intermittent every 24 hours  remdesivir  IVPB   IV Intermittent     Other medications:  ALBUTerol    90 MICROgram(s) HFA Inhaler 2 Puff(s) Inhalation every 6 hours  amLODIPine   Tablet 10 milliGRAM(s) Oral daily  dexAMETHasone  Injectable 6 milliGRAM(s) IV Push daily  heparin  Infusion.  Unit(s)/Hr IV Continuous <Continuous>      =======================================================  Labs:                        13.3   9.08  )-----------( 564      ( 02 Jun 2021 07:00 )             40.5     06-02    135  |  95<L>  |  9.3  ----------------------------<  102<H>  3.7   |  27.0  |  0.72    Ca    9.1      02 Jun 2021 06:59  Mg     2.3     06-02    TPro  7.5  /  Alb  3.4  /  TBili  0.8  /  DBili  0.1  /  AST  75<H>  /  ALT  165<H>  /  AlkPhos  83  06-02    C-Reactive Protein, Serum: 57 mg/L (06-01-21 @ 08:57)    Procalcitonin, Serum: 0.14 ng/mL (06-01-21 @ 08:57)    COVID-19 PCR: Detected (06-01-21 @ 08:59)      < from: US Duplex Venous Lower Ext Complete, Bilateral (06.01.21 @ 15:59) >     EXAM:  US DPLX LWR EXT VEINS COMPL BI                          PROCEDURE DATE:  06/01/2021          INTERPRETATION:  CLINICAL INFORMATION: Pulmonary embolism. COVID.    COMPARISON: CTA chest of the same day    TECHNIQUE: Duplex sonography of the BILATERAL LOWER extremity veins with color and spectral Doppler, with and without compression.    FINDINGS:    RIGHT:  Normal compressibility of the RIGHT common femoral, femoral and popliteal veins.  Doppler examination shows normal spontaneous and phasic flow.  No RIGHT calf vein thrombosis is detected.    LEFT:  Normal compressibility of the LEFT common femoral, femoral and popliteal veins.  Doppler examination shows normal spontaneous and phasic flow.  No LEFT calf vein thrombosis is detected.    IMPRESSION:  No evidence of deep venous thrombosis in either lower extremity.      JALEN LEON MD; Attending Radiologist  This document has been electronically signed. Jun 1 2021  4:20PM    < end of copied text >        < from: CT Angio Chest PE Protocol w/ IV Cont (06.01.21 @ 12:45) >     EXAM:  CT ANGIO CHEST PULM ART [facility]                          PROCEDURE DATE:  06/01/2021          INTERPRETATION:  CLINICAL INFORMATION: Covid. Tachycardia. Hypoxia.    COMPARISON: Frontal chest radiograph performed the same day.    CONTRAST/COMPLICATIONS:  IV Contrast: Omnipaque 350  60 cc administered   40 cc discarded  Oral Contrast: NONE  Complications: None reported at time of study completion    PROCEDURE:  CT Angiography of the Chest.  Sagittal and coronal reformats were performed as well as 3D (MIP) reconstructions.    FINDINGS:    LUNGS AND AIRWAYS: Patent central airways.  There are extensive coarse consolidative opacities throughout the lungs, most prominent in the lower lobes, compatible with history of Covid pneumonia.  PLEURA: No pleural effusion. No pneumothorax or pneumomediastinum.  MEDIASTINUM AND LASHAE: There are prominent mediastinal and bilateral hilar lymph nodes. There is no axillary lymphadenopathy.  VESSELS: There is a tubular filling defect within a medial branch of the posterior basilar segment artery of the left lower lobe, compatible with pulmonary embolism. No additional filling defect is identified within the first, second, or third order branches of the pulmonary arteries. The pulmonary trunk and main pulmonary arteries are unremarkable. The thoracic aorta and great vessels are unremarkable.  HEART: Heart size is normal. No pericardial effusion.  CHEST WALL AND LOWER NECK: Within normal limits.  VISUALIZED UPPER ABDOMEN: Within normal limits.  BONES: Within normal limits.    IMPRESSION:  1. Segmental pulmonary embolism in the left lower lobe.  2. Extensive coarse consolidative opacities with prominent mediastinal and bilateral hilar lymph nodes, compatible with Covid pneumonia.  3. Results discussed with Dr. Marsh at time of interpretation.         ARJUN CHAMBERLAIN M.D., ATTENDING RADIOLOGIST  This document has been electronically signed. Jun 1 2021 12:53PM    < end of copied text >

## 2021-06-02 NOTE — PROCEDURE NOTE - ADDITIONAL PROCEDURE DETAILS
Tourniquet removed. Sharps disposed. Bed lowered and rails placed upright. Patient tolerated procedure well.

## 2021-06-02 NOTE — PROGRESS NOTE ADULT - ASSESSMENT
This 34yo M w/pmh HTN presents for SOB x3 weeks associated with SOB worse w/ exertion assoc/w productive cough, chest heaviness, generalized weakness with subjective fevers. Denies n/v, abdominal pain. Reports self-isolating since then. Denies known COVID contacts however 4 days prior to symptoms pt went to a restaurant for dinner and says it was busy. In ED found ot have COVID PNA and Left Lower Lobe PE on CT. (01 Jun 2021 13:55)    He lives alone. Last trip outside was for mother's day celebration with family., crowded restaurant.  All other family members were vaccinated.   he got sick.   now here.   CT scan reviewed.  LLL Subsegmental PE.    Covid testing is positive here.   dexamethasone given by ER.    Remdesivir ordered by medical team.       Impression:  COVID-19 infection   Viral pneumonia  shortness of breath  lung infiltrates  dependence on oxygen  LLL Subsegmental PE        Plan:  - Remdesivir IV daily, ordered for a 5 day course.   - MAY stop earlier than 5 days, and send home, if patient is back on Ambient oxygen/ room air.   - GIVEN duration of his symptoms, patient no longer candidate for Actemra    - continue dexamethasone 6mg daily, While on remdesivir  trend Inflammatory markers and LFTs   - trend CBC with diff, CMP  daily    - Continue supportive care measures  - continue Oxygenation as needed;  CONTINUE to titrate down as tolerated  - self- proning  as tolerated  - ENCOURAGED OOB to chair  - encouraged incentive spirometry     Anticoagulation as per medical team  - lower ext DVT study is NEGATIVE      Will follow with you.

## 2021-06-02 NOTE — PROGRESS NOTE ADULT - SUBJECTIVE AND OBJECTIVE BOX
Chief Complaint:  sob    SUBJECTIVE / OVERNIGHT EVENTS:  Overnight pt reported to be very anxious.  Pt reports feeling anxious today w/ palpitations.   SOBpersists but denies cough, cp, abd pain, N/V, fever, chills, dysuria or any other complaints. All remainder ROS negative.       I&O's Summary    01 Jun 2021 07:01  -  02 Jun 2021 07:00  --------------------------------------------------------  IN: 282 mL / OUT: 0 mL / NET: 282 mL    02 Jun 2021 07:01  -  02 Jun 2021 19:54  --------------------------------------------------------  IN: 1245 mL / OUT: 700 mL / NET: 545 mL          PHYSICAL EXAM:  Vital Signs Last 24 Hrs  T(C): 36.8 (02 Jun 2021 17:30), Max: 37 (01 Jun 2021 21:43)  T(F): 98.3 (02 Jun 2021 17:30), Max: 98.6 (01 Jun 2021 21:43)  HR: 126 (02 Jun 2021 17:30) (97 - 126)  BP: 134/89 (02 Jun 2021 17:30) (134/89 - 154/104)  BP(mean): --  RR: 18 (02 Jun 2021 17:30) (18 - 20)  SpO2: 94% (02 Jun 2021 17:30) (92% - 94%)      GENERAL: pt examined bedside, laying comfortably in bed in NAD  HEENT: NC/AT, moist oral mucosa, clear conjunctiva, sclera nonicteric  RESPIRATORY: poor inspiratory effort, no wheezing, rhonchi, rales  CARDIOVASCULAR: fast rate, regular rhythm, normal S1 and S2, no murmur/rub/gallop  ABDOMEN: soft, obese abdomen, NT/ND, normoactive bowel sounds, no rebound/guarding  EXTREMITIES: No cynaosis, no clubbing, no lower extremity edema; Peripheral pulses are 2+ bilaterally  PSYCH: anxious affect, hostile at times and intermittently agitated   NEUROLOGY: A+O to person, place, and time, no focal neurologic deficits appreciated   SKIN: No rashes or no palpable lesions        LABS:                        13.3   9.08  )-----------( 564      ( 02 Jun 2021 07:00 )             40.5     06-02    135  |  95<L>  |  9.3  ----------------------------<  102<H>  3.7   |  27.0  |  0.72    Ca    9.1      02 Jun 2021 06:59  Mg     2.3     06-02    TPro  7.5  /  Alb  3.4  /  TBili  0.8  /  DBili  0.1  /  AST  75<H>  /  ALT  165<H>  /  AlkPhos  83  06-02    PT/INR - ( 02 Jun 2021 06:59 )   PT: 14.5 sec;   INR: 1.26 ratio         PTT - ( 02 Jun 2021 15:39 )  PTT:103.3 sec  CARDIAC MARKERS ( 01 Jun 2021 08:57 )  x     / <0.01 ng/mL / x     / x     / x              CAPILLARY BLOOD GLUCOSE            RADIOLOGY & ADDITIONAL TESTS:          MEDICATIONS  (STANDING):  ALBUTerol    90 MICROgram(s) HFA Inhaler 2 Puff(s) Inhalation every 6 hours  amLODIPine   Tablet 10 milliGRAM(s) Oral daily  dexAMETHasone  Injectable 6 milliGRAM(s) IV Push daily  heparin  Infusion.  Unit(s)/Hr (24 mL/Hr) IV Continuous <Continuous>  remdesivir  IVPB   IV Intermittent     MEDICATIONS  (PRN):  acetaminophen   Tablet .. 650 milliGRAM(s) Oral every 4 hours PRN Temp greater or equal to 38.5C (101.3F)  acetaminophen  Suppository .. 650 milliGRAM(s) Rectal every 4 hours PRN Temp greater or equal to 38.5C (101.3F)  heparin   Injectable 25284 Unit(s) IV Push every 6 hours PRN For aPTT less than 40  heparin   Injectable 5000 Unit(s) IV Push every 6 hours PRN For aPTT between 40 - 57

## 2021-06-02 NOTE — PROGRESS NOTE ADULT - ASSESSMENT
34yo M w/pmh HTN presents for SOB x3 weeks associated with SOB worse w/ exertion assoc/w productive cough, chest heaviness, generalized weakness with subjective fevers. Denies n/v, abdominal pain. Reports self-isolating since then. Denies known COVID contacts however 4 days prior to symptoms pt went to a restaurant for dinner and says it was busy. In ED found ot have COVID PNA and Left Lower Lobe PE on CT.      Acute hypoxic respiratory failure 2/2 COVID PNA and LLL PE    - c/w supplemental O2 to maintain O2 sat >90%  - c/w bronchodialors, incentive spirometry and frequent proning  - dexamethasone 6mg IV daily and remdesivir day 2  - Monitor renal and liver function while on remdesivir  - Inflammatory markers q48h  - c/w heparin gtt   - ID consulted      Provoked acute Left Lower Lobe Pulmonary Embolism  - c/w heparin drip  - Adjust gtt per protocol  -       HTN  - Norvasc 10mg daily   - Monitor BP      New onset DM II  - A1c 5.9  - Place on basal bolus regimen and titrate to goal BG <180  - ISS and hypoglycemic protocol on board    36yo M w/pmh HTN presents for SOB x3 weeks associated with SOB worse w/ exertion assoc/w productive cough, chest heaviness, generalized weakness with subjective fevers. Denies n/v, abdominal pain. Reports self-isolating since then. Denies known COVID contacts however 4 days prior to symptoms pt went to a restaurant for dinner and says it was busy. In ED found ot have COVID PNA and Left Lower Lobe PE on CT.      Acute hypoxic respiratory failure 2/2 COVID PNA and LLL PE    - c/w supplemental O2 to maintain O2 sat >90%  - c/w bronchodialors, incentive spirometry and frequent proning  - dexamethasone 6mg IV daily and remdesivir day 2  - Monitor renal and liver function while on remdesivir  - Inflammatory markers q48h  - c/w heparin gtt   - ID consulted      Provoked acute Left Lower Lobe Pulmonary Embolism  - Likely 2/2 COVID  - c/w heparin drip  - Adjust gtt per protocol  - TTE noted.  No evidence of Rt heart strain  - Transition to PO AC prior to d/c      HTN  - Norvasc 10mg daily   - Monitor BP      New onset DM II  - A1c 5.9  - Place on basal bolus regimen and titrate to goal BG <180  - ISS and hypoglycemic protocol on board       VTE: on heparin gtt    Dispo: no plans for discharge at this time as pt remains acute.

## 2021-06-03 DIAGNOSIS — F10.10 ALCOHOL ABUSE, UNCOMPLICATED: ICD-10-CM

## 2021-06-03 LAB
ALBUMIN SERPL ELPH-MCNC: 3.5 G/DL — SIGNIFICANT CHANGE UP (ref 3.3–5.2)
ALBUMIN SERPL ELPH-MCNC: 3.5 G/DL — SIGNIFICANT CHANGE UP (ref 3.3–5.2)
ALP SERPL-CCNC: 82 U/L — SIGNIFICANT CHANGE UP (ref 40–120)
ALP SERPL-CCNC: 82 U/L — SIGNIFICANT CHANGE UP (ref 40–120)
ALT FLD-CCNC: 205 U/L — HIGH
ALT FLD-CCNC: 206 U/L — HIGH
ANION GAP SERPL CALC-SCNC: 13 MMOL/L — SIGNIFICANT CHANGE UP (ref 5–17)
APTT BLD: 103.4 SEC — HIGH (ref 27.5–35.5)
APTT BLD: 63.1 SEC — HIGH (ref 27.5–35.5)
AST SERPL-CCNC: 96 U/L — HIGH
AST SERPL-CCNC: 98 U/L — HIGH
BILIRUB DIRECT SERPL-MCNC: 0.2 MG/DL — SIGNIFICANT CHANGE UP (ref 0–0.3)
BILIRUB INDIRECT FLD-MCNC: 0.5 MG/DL — SIGNIFICANT CHANGE UP (ref 0.2–1)
BILIRUB SERPL-MCNC: 0.7 MG/DL — SIGNIFICANT CHANGE UP (ref 0.4–2)
BILIRUB SERPL-MCNC: 0.7 MG/DL — SIGNIFICANT CHANGE UP (ref 0.4–2)
BUN SERPL-MCNC: 11.4 MG/DL — SIGNIFICANT CHANGE UP (ref 8–20)
CALCIUM SERPL-MCNC: 9 MG/DL — SIGNIFICANT CHANGE UP (ref 8.6–10.2)
CHLORIDE SERPL-SCNC: 95 MMOL/L — LOW (ref 98–107)
CO2 SERPL-SCNC: 24 MMOL/L — SIGNIFICANT CHANGE UP (ref 22–29)
CREAT SERPL-MCNC: 0.76 MG/DL — SIGNIFICANT CHANGE UP (ref 0.5–1.3)
CRP SERPL-MCNC: 27 MG/L — HIGH
D DIMER BLD IA.RAPID-MCNC: 792 NG/ML DDU — HIGH
FERRITIN SERPL-MCNC: 1406 NG/ML — HIGH (ref 30–400)
GLUCOSE BLDC GLUCOMTR-MCNC: 113 MG/DL — HIGH (ref 70–99)
GLUCOSE BLDC GLUCOMTR-MCNC: 139 MG/DL — HIGH (ref 70–99)
GLUCOSE BLDC GLUCOMTR-MCNC: 147 MG/DL — HIGH (ref 70–99)
GLUCOSE SERPL-MCNC: 96 MG/DL — SIGNIFICANT CHANGE UP (ref 70–99)
HCT VFR BLD CALC: 37.5 % — LOW (ref 39–50)
HGB BLD-MCNC: 12.9 G/DL — LOW (ref 13–17)
INR BLD: 1.21 RATIO — HIGH (ref 0.88–1.16)
LDH SERPL L TO P-CCNC: 382 U/L — HIGH (ref 98–192)
MAGNESIUM SERPL-MCNC: 1.9 MG/DL — SIGNIFICANT CHANGE UP (ref 1.6–2.6)
MCHC RBC-ENTMCNC: 30.8 PG — SIGNIFICANT CHANGE UP (ref 27–34)
MCHC RBC-ENTMCNC: 34.4 GM/DL — SIGNIFICANT CHANGE UP (ref 32–36)
MCV RBC AUTO: 89.5 FL — SIGNIFICANT CHANGE UP (ref 80–100)
PLATELET # BLD AUTO: 540 K/UL — HIGH (ref 150–400)
POTASSIUM SERPL-MCNC: 3.3 MMOL/L — LOW (ref 3.5–5.3)
POTASSIUM SERPL-SCNC: 3.3 MMOL/L — LOW (ref 3.5–5.3)
PROCALCITONIN SERPL-MCNC: 0.16 NG/ML — HIGH (ref 0.02–0.1)
PROT SERPL-MCNC: 7.3 G/DL — SIGNIFICANT CHANGE UP (ref 6.6–8.7)
PROT SERPL-MCNC: 7.3 G/DL — SIGNIFICANT CHANGE UP (ref 6.6–8.7)
PROTHROM AB SERPL-ACNC: 13.9 SEC — HIGH (ref 10.6–13.6)
RBC # BLD: 4.19 M/UL — LOW (ref 4.2–5.8)
RBC # FLD: 11.8 % — SIGNIFICANT CHANGE UP (ref 10.3–14.5)
SODIUM SERPL-SCNC: 132 MMOL/L — LOW (ref 135–145)
WBC # BLD: 10.29 K/UL — SIGNIFICANT CHANGE UP (ref 3.8–10.5)
WBC # FLD AUTO: 10.29 K/UL — SIGNIFICANT CHANGE UP (ref 3.8–10.5)

## 2021-06-03 PROCEDURE — 90792 PSYCH DIAG EVAL W/MED SRVCS: CPT

## 2021-06-03 PROCEDURE — 99233 SBSQ HOSP IP/OBS HIGH 50: CPT

## 2021-06-03 RX ORDER — GLUCAGON INJECTION, SOLUTION 0.5 MG/.1ML
1 INJECTION, SOLUTION SUBCUTANEOUS ONCE
Refills: 0 | Status: DISCONTINUED | OUTPATIENT
Start: 2021-06-03 | End: 2021-06-11

## 2021-06-03 RX ORDER — DEXTROSE 50 % IN WATER 50 %
25 SYRINGE (ML) INTRAVENOUS ONCE
Refills: 0 | Status: DISCONTINUED | OUTPATIENT
Start: 2021-06-03 | End: 2021-06-11

## 2021-06-03 RX ORDER — DEXTROSE 50 % IN WATER 50 %
12.5 SYRINGE (ML) INTRAVENOUS ONCE
Refills: 0 | Status: DISCONTINUED | OUTPATIENT
Start: 2021-06-03 | End: 2021-06-11

## 2021-06-03 RX ORDER — POTASSIUM CHLORIDE 20 MEQ
40 PACKET (EA) ORAL ONCE
Refills: 0 | Status: COMPLETED | OUTPATIENT
Start: 2021-06-03 | End: 2021-06-03

## 2021-06-03 RX ORDER — INSULIN GLARGINE 100 [IU]/ML
5 INJECTION, SOLUTION SUBCUTANEOUS AT BEDTIME
Refills: 0 | Status: DISCONTINUED | OUTPATIENT
Start: 2021-06-03 | End: 2021-06-11

## 2021-06-03 RX ORDER — SODIUM CHLORIDE 9 MG/ML
1000 INJECTION, SOLUTION INTRAVENOUS
Refills: 0 | Status: DISCONTINUED | OUTPATIENT
Start: 2021-06-03 | End: 2021-06-11

## 2021-06-03 RX ORDER — LANOLIN ALCOHOL/MO/W.PET/CERES
5 CREAM (GRAM) TOPICAL AT BEDTIME
Refills: 0 | Status: DISCONTINUED | OUTPATIENT
Start: 2021-06-03 | End: 2021-06-11

## 2021-06-03 RX ORDER — LISINOPRIL 2.5 MG/1
5 TABLET ORAL DAILY
Refills: 0 | Status: DISCONTINUED | OUTPATIENT
Start: 2021-06-03 | End: 2021-06-11

## 2021-06-03 RX ORDER — INSULIN LISPRO 100/ML
1 VIAL (ML) SUBCUTANEOUS
Refills: 0 | Status: DISCONTINUED | OUTPATIENT
Start: 2021-06-03 | End: 2021-06-11

## 2021-06-03 RX ORDER — ESCITALOPRAM OXALATE 10 MG/1
5 TABLET, FILM COATED ORAL DAILY
Refills: 0 | Status: DISCONTINUED | OUTPATIENT
Start: 2021-06-03 | End: 2021-06-05

## 2021-06-03 RX ORDER — APIXABAN 2.5 MG/1
10 TABLET, FILM COATED ORAL EVERY 12 HOURS
Refills: 0 | Status: COMPLETED | OUTPATIENT
Start: 2021-06-03 | End: 2021-06-10

## 2021-06-03 RX ORDER — DEXTROSE 50 % IN WATER 50 %
15 SYRINGE (ML) INTRAVENOUS ONCE
Refills: 0 | Status: DISCONTINUED | OUTPATIENT
Start: 2021-06-03 | End: 2021-06-11

## 2021-06-03 RX ORDER — INSULIN LISPRO 100/ML
VIAL (ML) SUBCUTANEOUS
Refills: 0 | Status: DISCONTINUED | OUTPATIENT
Start: 2021-06-03 | End: 2021-06-11

## 2021-06-03 RX ADMIN — Medication 200 MILLIGRAM(S): at 21:07

## 2021-06-03 RX ADMIN — Medication 1 MILLIGRAM(S): at 12:11

## 2021-06-03 RX ADMIN — Medication 200 MILLIGRAM(S): at 05:24

## 2021-06-03 RX ADMIN — Medication 200 MILLIGRAM(S): at 16:27

## 2021-06-03 RX ADMIN — Medication 100 MILLIGRAM(S): at 16:28

## 2021-06-03 RX ADMIN — HEPARIN SODIUM 2400 UNIT(S)/HR: 5000 INJECTION INTRAVENOUS; SUBCUTANEOUS at 03:02

## 2021-06-03 RX ADMIN — Medication 1 UNIT(S): at 16:29

## 2021-06-03 RX ADMIN — Medication 6 MILLIGRAM(S): at 05:27

## 2021-06-03 RX ADMIN — REMDESIVIR 500 MILLIGRAM(S): 5 INJECTION INTRAVENOUS at 14:32

## 2021-06-03 RX ADMIN — Medication 2 MILLIGRAM(S): at 01:07

## 2021-06-03 RX ADMIN — ESCITALOPRAM OXALATE 5 MILLIGRAM(S): 10 TABLET, FILM COATED ORAL at 16:27

## 2021-06-03 RX ADMIN — INSULIN GLARGINE 5 UNIT(S): 100 INJECTION, SOLUTION SUBCUTANEOUS at 21:03

## 2021-06-03 RX ADMIN — LISINOPRIL 5 MILLIGRAM(S): 2.5 TABLET ORAL at 16:27

## 2021-06-03 RX ADMIN — Medication 5 MILLIGRAM(S): at 21:03

## 2021-06-03 RX ADMIN — HEPARIN SODIUM 2400 UNIT(S)/HR: 5000 INJECTION INTRAVENOUS; SUBCUTANEOUS at 10:35

## 2021-06-03 RX ADMIN — AMLODIPINE BESYLATE 10 MILLIGRAM(S): 2.5 TABLET ORAL at 05:27

## 2021-06-03 RX ADMIN — Medication 0.5 MILLIGRAM(S): at 20:54

## 2021-06-03 RX ADMIN — APIXABAN 10 MILLIGRAM(S): 2.5 TABLET, FILM COATED ORAL at 16:57

## 2021-06-03 RX ADMIN — ALBUTEROL 2 PUFF(S): 90 AEROSOL, METERED ORAL at 13:59

## 2021-06-03 RX ADMIN — ALBUTEROL 2 PUFF(S): 90 AEROSOL, METERED ORAL at 21:06

## 2021-06-03 RX ADMIN — Medication 40 MILLIEQUIVALENT(S): at 08:14

## 2021-06-03 NOTE — PROGRESS NOTE ADULT - SUBJECTIVE AND OBJECTIVE BOX
Chief Complaint:  sob    SUBJECTIVE / OVERNIGHT EVENTS:      SOB persists but denies cough, cp, abd pain, N/V, fever, chills, dysuria or any other complaints. All remainder ROS negative.       I&O's Summary    01 Jun 2021 07:01  -  02 Jun 2021 07:00  --------------------------------------------------------  IN: 282 mL / OUT: 0 mL / NET: 282 mL    02 Jun 2021 07:01  -  02 Jun 2021 19:54  --------------------------------------------------------  IN: 1245 mL / OUT: 700 mL / NET: 545 mL          PHYSICAL EXAM:  Vital Signs Last 24 Hrs  T(C): 36.6 (03 Jun 2021 08:06), Max: 37 (03 Jun 2021 05:20)  T(F): 97.9 (03 Jun 2021 08:06), Max: 98.6 (03 Jun 2021 05:20)  HR: 102 (03 Jun 2021 08:06) (102 - 126)  BP: 132/85 (03 Jun 2021 08:06) (125/74 - 134/89)  BP(mean): 97 (03 Jun 2021 05:20) (97 - 97)  RR: 18 (03 Jun 2021 08:06) (18 - 18)  SpO2: 95% (03 Jun 2021 08:06) (93% - 96%)      GENERAL: pt examined bedside, laying comfortably in bed in NAD  HEENT: NC/AT, moist oral mucosa, clear conjunctiva, sclera nonicteric  RESPIRATORY: poor inspiratory effort, no wheezing, rhonchi, rales  CARDIOVASCULAR: fast rate, regular rhythm, normal S1 and S2, no murmur/rub/gallop  ABDOMEN: soft, obese abdomen, NT/ND, normoactive bowel sounds, no rebound/guarding  EXTREMITIES: No cynaosis, no clubbing, no lower extremity edema; Peripheral pulses are 2+ bilaterally  PSYCH: anxious affect, hostile at times and intermittently agitated   NEUROLOGY: A+O to person, place, and time, no focal neurologic deficits appreciated   SKIN: No rashes or no palpable lesions        LABS:                                       12.9   10.29 )-----------( 540      ( 03 Jun 2021 05:44 )             37.5     06-03    132<L>  |  95<L>  |  11.4  ----------------------------<  96  3.3<L>   |  24.0  |  0.76    Ca    9.0      03 Jun 2021 05:44  Mg     1.9     06-03    TPro  7.3  /  Alb  3.5  /  TBili  0.7  /  DBili  0.2  /  AST  98<H>  /  ALT  206<H>  /  AlkPhos  82  06-03          CAPILLARY BLOOD GLUCOSE            RADIOLOGY & ADDITIONAL TESTS:          MEDICATIONS  (STANDING):  ALBUTerol    90 MICROgram(s) HFA Inhaler 2 Puff(s) Inhalation every 6 hours  amLODIPine   Tablet 10 milliGRAM(s) Oral daily  dexAMETHasone  Injectable 6 milliGRAM(s) IV Push daily  heparin  Infusion.  Unit(s)/Hr (24 mL/Hr) IV Continuous <Continuous>  remdesivir  IVPB   IV Intermittent     MEDICATIONS  (PRN):  acetaminophen   Tablet .. 650 milliGRAM(s) Oral every 4 hours PRN Temp greater or equal to 38.5C (101.3F)  acetaminophen  Suppository .. 650 milliGRAM(s) Rectal every 4 hours PRN Temp greater or equal to 38.5C (101.3F)  heparin   Injectable 03421 Unit(s) IV Push every 6 hours PRN For aPTT less than 40  heparin   Injectable 5000 Unit(s) IV Push every 6 hours PRN For aPTT between 40 - 57                                     Chief Complaint:  sob    SUBJECTIVE / OVERNIGHT EVENTS:  No acute events reported overnight.  Pt offers no acute complaints at this time.  SOB persists but denies cough, cp, abd pain, N/V, fever, chills, dysuria or any other complaints. All remainder ROS negative.       I&O's Summary    01 Jun 2021 07:01  -  02 Jun 2021 07:00  --------------------------------------------------------  IN: 282 mL / OUT: 0 mL / NET: 282 mL    02 Jun 2021 07:01  -  02 Jun 2021 19:54  --------------------------------------------------------  IN: 1245 mL / OUT: 700 mL / NET: 545 mL          PHYSICAL EXAM:  Vital Signs Last 24 Hrs  T(C): 36.6 (03 Jun 2021 08:06), Max: 37 (03 Jun 2021 05:20)  T(F): 97.9 (03 Jun 2021 08:06), Max: 98.6 (03 Jun 2021 05:20)  HR: 102 (03 Jun 2021 08:06) (102 - 126)  BP: 132/85 (03 Jun 2021 08:06) (125/74 - 134/89)  BP(mean): 97 (03 Jun 2021 05:20) (97 - 97)  RR: 18 (03 Jun 2021 08:06) (18 - 18)  SpO2: 95% (03 Jun 2021 08:06) (93% - 96%)      GENERAL: pt examined bedside, laying comfortably in bed in NAD, speaking full sentences  HEENT: NC/AT, moist oral mucosa, clear conjunctiva, sclera nonicteric  RESPIRATORY: poor inspiratory effort, no wheezing, rhonchi, rales  CARDIOVASCULAR: fast rate, regular rhythm, normal S1 and S2, no murmur/rub/gallop  ABDOMEN: soft, obese abdomen, NT/ND, normoactive bowel sounds, no rebound/guarding  EXTREMITIES: No cynaosis, no clubbing, no lower extremity edema; Peripheral pulses are 2+ bilaterally  PSYCH: anxious affect, hostile at times and intermittently agitated   NEUROLOGY: A+O to person, place, and time, no focal neurologic deficits appreciated   SKIN: No rashes or no palpable lesions        LABS:                                       12.9   10.29 )-----------( 540      ( 03 Jun 2021 05:44 )             37.5     06-03    132<L>  |  95<L>  |  11.4  ----------------------------<  96  3.3<L>   |  24.0  |  0.76    Ca    9.0      03 Jun 2021 05:44  Mg     1.9     06-03    TPro  7.3  /  Alb  3.5  /  TBili  0.7  /  DBili  0.2  /  AST  98<H>  /  ALT  206<H>  /  AlkPhos  82  06-03          CAPILLARY BLOOD GLUCOSE            RADIOLOGY & ADDITIONAL TESTS:          MEDICATIONS  (STANDING):  ALBUTerol    90 MICROgram(s) HFA Inhaler 2 Puff(s) Inhalation every 6 hours  amLODIPine   Tablet 10 milliGRAM(s) Oral daily  dexAMETHasone  Injectable 6 milliGRAM(s) IV Push daily  heparin  Infusion.  Unit(s)/Hr (24 mL/Hr) IV Continuous <Continuous>  remdesivir  IVPB   IV Intermittent     MEDICATIONS  (PRN):  acetaminophen   Tablet .. 650 milliGRAM(s) Oral every 4 hours PRN Temp greater or equal to 38.5C (101.3F)  acetaminophen  Suppository .. 650 milliGRAM(s) Rectal every 4 hours PRN Temp greater or equal to 38.5C (101.3F)  heparin   Injectable 39503 Unit(s) IV Push every 6 hours PRN For aPTT less than 40  heparin   Injectable 5000 Unit(s) IV Push every 6 hours PRN For aPTT between 40 - 57

## 2021-06-03 NOTE — BH CONSULTATION LIAISON ASSESSMENT NOTE - NSBHCHARTREVIEWVS_PSY_A_CORE FT
Vital Signs Last 24 Hrs  T(C): 36.6 (03 Jun 2021 08:06), Max: 37 (03 Jun 2021 05:20)  T(F): 97.9 (03 Jun 2021 08:06), Max: 98.6 (03 Jun 2021 05:20)  HR: 102 (03 Jun 2021 08:06) (102 - 126)  BP: 132/85 (03 Jun 2021 08:06) (125/74 - 134/89)  BP(mean): 97 (03 Jun 2021 05:20) (97 - 97)  RR: 18 (03 Jun 2021 08:06) (18 - 18)  SpO2: 95% (03 Jun 2021 08:06) (93% - 96%)

## 2021-06-03 NOTE — BH CONSULTATION LIAISON ASSESSMENT NOTE - NSBHCHARTREVIEWLAB_PSY_A_CORE FT
Comprehensive Metabolic Panel in AM (06.03.21 @ 05:44)   Sodium, Serum: 132 mmol/L   Potassium, Serum: 3.3 mmol/L   Chloride, Serum: 95 mmol/L   Carbon Dioxide, Serum: 24.0 mmol/L   Anion Gap, Serum: 13 mmol/L   Blood Urea Nitrogen, Serum: 11.4 mg/dL   Creatinine, Serum: 0.76 mg/dL   Glucose, Serum: 96 mg/dL   Calcium, Total Serum: 9.0 mg/dL   Protein Total, Serum: 7.3 g/dL   Albumin, Serum: 3.5 g/dL   Bilirubin Total, Serum: 0.7 mg/dL   Alkaline Phosphatase, Serum: 82 U/L   Aspartate Aminotransferase (AST/SGOT): 98 U/L   Alanine Aminotransferase (ALT/SGPT): 206 U/L   eGFR if Non : 119: Interpretative comment   The units for eGFR are mL/min/1.73M2 (normalized body surface area). The   eGFR is calculated from a serum creatinine using the CKD-EPI equation.

## 2021-06-03 NOTE — BH CONSULTATION LIAISON ASSESSMENT NOTE - NSACTIVEVENT_PSY_ALL_CORE
Triggering events leading to humiliation, shame, and/or despair (e.g., Loss of relationship, financial or health status) (real or anticipated)/Chronic pain or other acute medical condition/Substance intoxication or withdrawal
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
## 2021-06-03 NOTE — BH CONSULTATION LIAISON ASSESSMENT NOTE - NSBHREFERDETAILS_PSY_A_CORE_FT
History of Present Illness:  Reason for Admission: SOB and fevers COVID PNA and PE  History of Present Illness:   c/p h and p  36yo M w/pmh HTN presents for SOB x3 weeks associated with SOB worse w/ exertion assoc/w productive cough, chest heaviness, generalized weakness with subjective fevers. Denies n/v, abdominal pain. Reports self-isolating since then. Denies known COVID contacts however 4 days prior to symptoms pt went to a restaurant for dinner and says it was busy. In ED found ot have COVID PNA and Left Lower Lobe PE on CT.    Psych conult for severe anxiety and panic

## 2021-06-03 NOTE — SBIRT NOTE ADULT - NSSBIRTALCPASSREFTXDET_GEN_A_CORE
Pt reports ETOH abuse to woker. Reports he drinks 1 bottle of wine per day for last 2 years. Pt reports no guilt but understands he should stop. Pt has no treatment of formal treatment. Reports he tried AA but felt that it did not work. Pt declined resources at this time but worker left them with pt in case he changes his mind.

## 2021-06-03 NOTE — BH CONSULTATION LIAISON ASSESSMENT NOTE - NSBHCHARTREVIEWINVESTIGATE_PSY_A_CORE FT
Ventricular Rate 113 BPM    Atrial Rate 113 BPM    P-R Interval 154 ms    QRS Duration 98 ms    Q-T Interval 354 ms    QTC Calculation(Bazett) 485 ms    P Axis 32 degrees    R Axis 8 degrees    T Axis 36 degrees    Diagnosis Line Sinus tachycardia  Otherwise normal ECG    Confirmed by Kulwinder Grey ([id number]) on 6/1/2021 11:02:07 AM

## 2021-06-03 NOTE — PROGRESS NOTE ADULT - ASSESSMENT
36yo M w/pmh HTN presents for SOB x3 weeks associated with SOB worse w/ exertion assoc/w productive cough, chest heaviness, generalized weakness with subjective fevers. Denies n/v, abdominal pain. Reports self-isolating since then. Denies known COVID contacts however 4 days prior to symptoms pt went to a restaurant for dinner and says it was busy. In ED found ot have COVID PNA and Left Lower Lobe PE on CT.      Acute hypoxic respiratory failure 2/2 COVID PNA and LLL PE    - c/w supplemental O2 to maintain O2 sat >90%  - c/w bronchodilators incentive spirometry and frequent proning  - dexamethasone 6mg IV daily and remdesivir day 2  - Monitor renal and liver function while on remdesivir  - Inflammatory markers q48h  - c/w heparin gtt   - ID consulted      Provoked acute Left Lower Lobe Pulmonary Embolism  - Likely 2/2 COVID  - c/w heparin drip  - Adjust gtt per protocol  - TTE noted.  No evidence of Rt heart strain  - Transition to PO AC prior to d/c      Hypokalemia  - Supplemented  - Will monitor electrolytes and supplement as needed  - Goal K~4      HTN  - In light of DM will d/c norvasc and switch to ACEI   - Monitor BP      DM II  - A1c 5.9  - Placed on basal bolus regimen and titrate to goal BG <180  - ISS and hypoglycemic protocol on board       ETOH abuse w/ impending withdrawal  - Place on CIWA protocol  - Tachycardia and hypertension could be sympathetic response to ETOH withdrawal   - Short course of librium taper started and Ativan per protocol      VTE: on heparin gtt    Dispo: no plans for discharge at this time as pt remains acute.  34yo M w/pmh HTN presents for SOB x3 weeks associated with SOB worse w/ exertion assoc/w productive cough, chest heaviness, generalized weakness with subjective fevers. Denies n/v, abdominal pain. Reports self-isolating since then. Denies known COVID contacts however 4 days prior to symptoms pt went to a restaurant for dinner and says it was busy. In ED found ot have COVID PNA and Left Lower Lobe PE on CT.      Acute hypoxic respiratory failure 2/2 COVID PNA and LLL PE    - c/w supplemental O2 to maintain O2 sat >90%  - c/w bronchodilators incentive spirometry and frequent proning  - dexamethasone 6mg IV daily and remdesivir day 3  - Monitor renal and liver function while on remdesivir  - Inflammatory markers q48h  - On heparin gtt but will switch to doac     - ID consulted      Provoked acute Left Lower Lobe Pulmonary Embolism  - Likely 2/2 COVID  - On heparin drip but will switch to doac  - TTE noted.  No evidence of Rt heart strain      Hypokalemia  - Supplemented  - Will monitor electrolytes and supplement as needed  - Goal K~4      HTN  - In light of DMII will d/c norvasc and switch to ACEI   - Monitor BP      Sinus tachycardia  - Multifactorial 2/2 COVID PNA, PE, anxiety   - If HR sustained > 120 will consider giving prn BB      DM II  - A1c 5.9  - Placed on basal bolus regimen and titrate to goal BG <180  - ISS and hypoglycemic protocol on board       Hx of ETOH abuse  - Last drink 3 weeks ago so no risk of withdrawal at this time  - Counseled on abstaining from etoh use      Anxiety/depression  - Start on Lexapro 5mg per psych recs  - Will f/u outpt w/ psych for medication management      VTE: on heparin gtt, will transition to doac    Dispo: no plans for discharge at this time as pt remains acute but improving and anticipate d/c in 2-3 days.

## 2021-06-03 NOTE — BH CONSULTATION LIAISON ASSESSMENT NOTE - CURRENT MEDICATION
MEDICATIONS  (STANDING):  ALBUTerol    90 MICROgram(s) HFA Inhaler 2 Puff(s) Inhalation every 6 hours  amLODIPine   Tablet 10 milliGRAM(s) Oral daily  dexAMETHasone  Injectable 6 milliGRAM(s) IV Push daily  heparin  Infusion.  Unit(s)/Hr (24 mL/Hr) IV Continuous <Continuous>  remdesivir  IVPB   IV Intermittent   remdesivir  IVPB 100 milliGRAM(s) IV Intermittent every 24 hours    MEDICATIONS  (PRN):  acetaminophen   Tablet .. 650 milliGRAM(s) Oral every 4 hours PRN Temp greater or equal to 38.5C (101.3F)  acetaminophen  Suppository .. 650 milliGRAM(s) Rectal every 4 hours PRN Temp greater or equal to 38.5C (101.3F)  heparin   Injectable 94333 Unit(s) IV Push every 6 hours PRN For aPTT less than 40  heparin   Injectable 5000 Unit(s) IV Push every 6 hours PRN For aPTT between 40 - 57  LORazepam     Tablet 1 milliGRAM(s) Oral three times a day PRN Anxiety

## 2021-06-04 ENCOUNTER — TRANSCRIPTION ENCOUNTER (OUTPATIENT)
Age: 36
End: 2021-06-04

## 2021-06-04 LAB
ALBUMIN SERPL ELPH-MCNC: 3.5 G/DL — SIGNIFICANT CHANGE UP (ref 3.3–5.2)
ALBUMIN SERPL ELPH-MCNC: 3.6 G/DL — SIGNIFICANT CHANGE UP (ref 3.3–5.2)
ALP SERPL-CCNC: 77 U/L — SIGNIFICANT CHANGE UP (ref 40–120)
ALP SERPL-CCNC: 78 U/L — SIGNIFICANT CHANGE UP (ref 40–120)
ALT FLD-CCNC: 261 U/L — HIGH
ALT FLD-CCNC: 267 U/L — HIGH
ANION GAP SERPL CALC-SCNC: 13 MMOL/L — SIGNIFICANT CHANGE UP (ref 5–17)
AST SERPL-CCNC: 123 U/L — HIGH
AST SERPL-CCNC: 129 U/L — HIGH
BILIRUB DIRECT SERPL-MCNC: 0.2 MG/DL — SIGNIFICANT CHANGE UP (ref 0–0.3)
BILIRUB INDIRECT FLD-MCNC: 0.5 MG/DL — SIGNIFICANT CHANGE UP (ref 0.2–1)
BILIRUB SERPL-MCNC: 0.7 MG/DL — SIGNIFICANT CHANGE UP (ref 0.4–2)
BILIRUB SERPL-MCNC: 0.7 MG/DL — SIGNIFICANT CHANGE UP (ref 0.4–2)
BUN SERPL-MCNC: 14.4 MG/DL — SIGNIFICANT CHANGE UP (ref 8–20)
CALCIUM SERPL-MCNC: 9.3 MG/DL — SIGNIFICANT CHANGE UP (ref 8.6–10.2)
CHLORIDE SERPL-SCNC: 95 MMOL/L — LOW (ref 98–107)
CO2 SERPL-SCNC: 23 MMOL/L — SIGNIFICANT CHANGE UP (ref 22–29)
CREAT SERPL-MCNC: 0.77 MG/DL — SIGNIFICANT CHANGE UP (ref 0.5–1.3)
GLUCOSE BLDC GLUCOMTR-MCNC: 111 MG/DL — HIGH (ref 70–99)
GLUCOSE BLDC GLUCOMTR-MCNC: 114 MG/DL — HIGH (ref 70–99)
GLUCOSE BLDC GLUCOMTR-MCNC: 118 MG/DL — HIGH (ref 70–99)
GLUCOSE BLDC GLUCOMTR-MCNC: 96 MG/DL — SIGNIFICANT CHANGE UP (ref 70–99)
GLUCOSE BLDC GLUCOMTR-MCNC: 99 MG/DL — SIGNIFICANT CHANGE UP (ref 70–99)
GLUCOSE SERPL-MCNC: 90 MG/DL — SIGNIFICANT CHANGE UP (ref 70–99)
HCT VFR BLD CALC: 36.4 % — LOW (ref 39–50)
HGB BLD-MCNC: 12.3 G/DL — LOW (ref 13–17)
INR BLD: 1.43 RATIO — HIGH (ref 0.88–1.16)
MCHC RBC-ENTMCNC: 30.5 PG — SIGNIFICANT CHANGE UP (ref 27–34)
MCHC RBC-ENTMCNC: 33.8 GM/DL — SIGNIFICANT CHANGE UP (ref 32–36)
MCV RBC AUTO: 90.3 FL — SIGNIFICANT CHANGE UP (ref 80–100)
PLATELET # BLD AUTO: 482 K/UL — HIGH (ref 150–400)
POTASSIUM SERPL-MCNC: 3.8 MMOL/L — SIGNIFICANT CHANGE UP (ref 3.5–5.3)
POTASSIUM SERPL-SCNC: 3.8 MMOL/L — SIGNIFICANT CHANGE UP (ref 3.5–5.3)
PROT SERPL-MCNC: 7 G/DL — SIGNIFICANT CHANGE UP (ref 6.6–8.7)
PROT SERPL-MCNC: 7.1 G/DL — SIGNIFICANT CHANGE UP (ref 6.6–8.7)
PROTHROM AB SERPL-ACNC: 16.3 SEC — HIGH (ref 10.6–13.6)
RBC # BLD: 4.03 M/UL — LOW (ref 4.2–5.8)
RBC # FLD: 11.9 % — SIGNIFICANT CHANGE UP (ref 10.3–14.5)
SODIUM SERPL-SCNC: 131 MMOL/L — LOW (ref 135–145)
WBC # BLD: 8.78 K/UL — SIGNIFICANT CHANGE UP (ref 3.8–10.5)
WBC # FLD AUTO: 8.78 K/UL — SIGNIFICANT CHANGE UP (ref 3.8–10.5)

## 2021-06-04 PROCEDURE — 99232 SBSQ HOSP IP/OBS MODERATE 35: CPT

## 2021-06-04 RX ADMIN — Medication 1 UNIT(S): at 17:54

## 2021-06-04 RX ADMIN — LISINOPRIL 5 MILLIGRAM(S): 2.5 TABLET ORAL at 06:12

## 2021-06-04 RX ADMIN — INSULIN GLARGINE 5 UNIT(S): 100 INJECTION, SOLUTION SUBCUTANEOUS at 21:30

## 2021-06-04 RX ADMIN — ALBUTEROL 2 PUFF(S): 90 AEROSOL, METERED ORAL at 13:52

## 2021-06-04 RX ADMIN — ALBUTEROL 2 PUFF(S): 90 AEROSOL, METERED ORAL at 08:00

## 2021-06-04 RX ADMIN — Medication 1 UNIT(S): at 12:45

## 2021-06-04 RX ADMIN — Medication 200 MILLIGRAM(S): at 02:37

## 2021-06-04 RX ADMIN — Medication 200 MILLIGRAM(S): at 13:54

## 2021-06-04 RX ADMIN — ALBUTEROL 2 PUFF(S): 90 AEROSOL, METERED ORAL at 02:39

## 2021-06-04 RX ADMIN — APIXABAN 10 MILLIGRAM(S): 2.5 TABLET, FILM COATED ORAL at 17:57

## 2021-06-04 RX ADMIN — Medication 6 MILLIGRAM(S): at 06:11

## 2021-06-04 RX ADMIN — REMDESIVIR 500 MILLIGRAM(S): 5 INJECTION INTRAVENOUS at 13:49

## 2021-06-04 RX ADMIN — ESCITALOPRAM OXALATE 5 MILLIGRAM(S): 10 TABLET, FILM COATED ORAL at 12:45

## 2021-06-04 RX ADMIN — APIXABAN 10 MILLIGRAM(S): 2.5 TABLET, FILM COATED ORAL at 06:13

## 2021-06-04 RX ADMIN — Medication 0.5 MILLIGRAM(S): at 08:23

## 2021-06-04 RX ADMIN — Medication 0.5 MILLIGRAM(S): at 21:30

## 2021-06-04 RX ADMIN — Medication 200 MILLIGRAM(S): at 08:24

## 2021-06-04 RX ADMIN — Medication 200 MILLIGRAM(S): at 20:08

## 2021-06-04 RX ADMIN — Medication 100 MILLIGRAM(S): at 13:54

## 2021-06-04 RX ADMIN — Medication 5 MILLIGRAM(S): at 21:31

## 2021-06-04 RX ADMIN — Medication 100 MILLIGRAM(S): at 06:18

## 2021-06-04 RX ADMIN — Medication 1 UNIT(S): at 07:59

## 2021-06-04 NOTE — DISCHARGE NOTE PROVIDER - NSDCCPCAREPLAN_GEN_ALL_CORE_FT
PRINCIPAL DISCHARGE DIAGNOSIS  Diagnosis: Acute hypoxemic respiratory failure due to COVID-19  Assessment and Plan of Treatment:       SECONDARY DISCHARGE DIAGNOSES  Diagnosis: Alcohol abuse, daily use  Assessment and Plan of Treatment:     Diagnosis: Pulmonary embolism, unspecified chronicity, unspecified pulmonary embolism type, unspecified whether acute cor pulmonale present  Assessment and Plan of Treatment:     Diagnosis: Anxiety  Assessment and Plan of Treatment:     Diagnosis: Hallucination  Assessment and Plan of Treatment:

## 2021-06-04 NOTE — PROGRESS NOTE ADULT - ASSESSMENT
36yo M w/pmh HTN presents for SOB x3 weeks associated with SOB worse w/ exertion assoc/w productive cough, chest heaviness, generalized weakness with subjective fevers. Denies n/v, abdominal pain. Reports self-isolating since then. Denies known COVID contacts however 4 days prior to symptoms pt went to a restaurant for dinner and says it was busy. In ED found ot have COVID PNA and Left Lower Lobe PE on CT.      Acute hypoxic respiratory failure 2/2 COVID PNA and LLL PE    - c/w supplemental O2 as needed to maintain O2 sat >90%  - c/w bronchodilators incentive spirometry and frequent proning  - dexamethasone 6mg IV daily and remdesivir day 4/5  - LFTs trending up.  Will continue w/ remdesivir given last dose tomorrow.   - Monitor renal and liver function while on remdesivir  - Inflammatory markers q48h    - ID consulted      Provoked acute Left Lower Lobe Pulmonary Embolism  - Likely 2/2 COVID  - Started on eliquis last night   - TTE noted.  No evidence of Rt heart strain      Hypokalemia  - Supplemented  - Will monitor electrolytes and supplement as needed  - Goal K~4      HTN  - c/w Lisinopril 5mg qd   - Monitor BP      Sinus tachycardia  - Improving  - Multifactorial 2/2 COVID PNA, PE, anxiety       DM II  - A1c 5.9  - Placed on basal bolus regimen and titrate to goal BG <180  - ISS and hypoglycemic protocol on board       Hx of ETOH abuse  - Last drink 3 weeks ago so no risk of withdrawal at this time  - Counseled on abstaining from etoh use      Anxiety/depression  - Start on Lexapro 5mg per psych recs  - Will f/u outpt w/ psych for medication management      VTE: on heparin gtt, will transition to doac    Dispo:  Anticipate d/c home in 1-2 days if pt medically stable and cleared by ID.  36yo M w/pmh HTN presents for SOB x3 weeks associated with SOB worse w/ exertion assoc/w productive cough, chest heaviness, generalized weakness with subjective fevers. Denies n/v, abdominal pain. Reports self-isolating since then. Denies known COVID contacts however 4 days prior to symptoms pt went to a restaurant for dinner and says it was busy. In ED found ot have COVID PNA and Left Lower Lobe PE on CT.      Acute hypoxic respiratory failure 2/2 COVID PNA and LLL PE    - c/w supplemental O2 as needed to maintain O2 sat >90%  - c/w bronchodilators incentive spirometry and frequent proning  - dexamethasone 6mg IV daily and remdesivir day 3  - LFTs trending up.  Will continue w/ remdesivir through tomorrow.    - Monitor renal and liver function while on remdesivir  - Inflammatory markers q48h    - ID consulted      Provoked acute Left Lower Lobe Pulmonary Embolism  - Likely 2/2 COVID  - Started on eliquis last night   - TTE noted.  No evidence of Rt heart strain      Hypokalemia  - Supplemented  - Will monitor electrolytes and supplement as needed  - Goal K~4      HTN  - c/w Lisinopril 5mg qd   - Monitor BP      Sinus tachycardia  - Improving  - Multifactorial 2/2 COVID PNA, PE, anxiety       DM II  - A1c 5.9  - Placed on basal bolus regimen and titrate to goal BG <180  - ISS and hypoglycemic protocol on board       Hx of ETOH abuse  - Last drink 3 weeks ago so no risk of withdrawal at this time  - Counseled on abstaining from etoh use      Anxiety/depression  - Start on Lexapro 5mg per psych recs  - Will f/u outpt w/ psych for medication management      VTE: on heparin gtt, will transition to doac    Dispo:  Anticipate d/c home in 1-2 days if pt medically stable and cleared by ID.

## 2021-06-04 NOTE — DISCHARGE NOTE PROVIDER - NSDCMRMEDTOKEN_GEN_ALL_CORE_FT
albuterol 90 mcg/inh inhalation aerosol: 2 puff(s) inhaled every 6 hours  apixaban 5 mg oral tablet: 2  tab(s) orally every 12 hours for 1 day then 1 tab po q12 hr  lisinopril 5 mg oral tablet: 1 tab(s) orally once a day  risperiDONE 1 mg oral tablet: 1 tab(s) orally 2 times a day

## 2021-06-04 NOTE — PROGRESS NOTE ADULT - ASSESSMENT
This 34yo M w/pmh HTN presents for SOB x3 weeks associated with SOB worse w/ exertion assoc/w productive cough, chest heaviness, generalized weakness with subjective fevers. Denies n/v, abdominal pain. Reports self-isolating since then. Denies known COVID contacts however 4 days prior to symptoms pt went to a restaurant for dinner and says it was busy. In ED found ot have COVID PNA and Left Lower Lobe PE on CT. (01 Jun 2021 13:55)    He lives alone. Last trip outside was for mother's day celebration with family., crowded restaurant.  All other family members were vaccinated.   he got sick.   now here.   CT scan reviewed.  LLL Subsegmental PE.    Covid testing is positive here.   dexamethasone given by ER.    Remdesivir ordered by medical team.       Impression:  COVID-19 infection   Viral pneumonia  shortness of breath  lung infiltrates  dependence on oxygen  LLL Subsegmental PE  LFT elevation        Plan:  - Remdesivir IV daily,   currently day #3 of a 5 day course.   - MAY stop earlier than 5 days, and send home, if patient is back on Ambient oxygen/ room air.   - GIVEN duration of his symptoms, patient no longer candidate for Actemra    LFT elevations may be from remdesivir  - if this still goes up by 6/5/2021, will need to stop the remdesivir    - continue dexamethasone 6mg daily, While on remdesivir  trend Inflammatory markers and LFTs   - trend CBC with diff, CMP  daily    - Continue supportive care measures  - continue Oxygenation as needed;  CONTINUE to titrate down as tolerated  - ENCOURAGED OOB to chair  - encouraged incentive spirometry     Anticoagulation as per medical team  - lower ext DVT study is NEGATIVE      No further specific infectious disease recommendations. Will be available as needed.      Thank you for allowing me to participate in the care of your patient.   Feel free to call me back for any new concerns.

## 2021-06-04 NOTE — DISCHARGE NOTE PROVIDER - NSDCFUADDAPPT_GEN_ALL_CORE_FT
psychiatry out pt   hematology 3 weeks   pcp one week psychiatry out pt   hematology 3 weeks   pcp one week. f/u BG

## 2021-06-04 NOTE — PROGRESS NOTE ADULT - SUBJECTIVE AND OBJECTIVE BOX
Chief Complaint:  sob    SUBJECTIVE / OVERNIGHT EVENTS:  No acute events reported overnight.       Denies cp, abd pain, N/V, fever, chills, dysuria or any other complaints. All remainder ROS negative.           PHYSICAL EXAM:  Vital Signs Last 24 Hrs  T(C): 36.6 (04 Jun 2021 07:50), Max: 36.9 (03 Jun 2021 16:58)  T(F): 97.9 (04 Jun 2021 07:50), Max: 98.4 (03 Jun 2021 16:58)  HR: 107 (04 Jun 2021 08:00) (94 - 116)  BP: 114/82 (04 Jun 2021 07:50) (114/82 - 139/89)  BP(mean): --  RR: 18 (04 Jun 2021 07:50) (16 - 18)  SpO2: 91% (04 Jun 2021 08:00) (91% - 95%)    GENERAL: pt examined bedside, laying comfortably in bed in NAD, speaking full sentences  HEENT: NC/AT, moist oral mucosa, clear conjunctiva, sclera nonicteric  RESPIRATORY: poor inspiratory effort, no wheezing, rhonchi, rales  CARDIOVASCULAR: fast rate, regular rhythm, normal S1 and S2, no murmur/rub/gallop  ABDOMEN: soft, obese abdomen, NT/ND, normoactive bowel sounds, no rebound/guarding  EXTREMITIES: No cynaosis, no clubbing, no lower extremity edema; Peripheral pulses are 2+ bilaterally  PSYCH: anxious affect, can be hostile at times but currently cooperative  NEUROLOGY: A+O to person, place, and time, no focal neurologic deficits appreciated   SKIN: No rashes or no palpable lesions        LABS:                                           12.3   8.78  )-----------( 482      ( 04 Jun 2021 06:15 )             36.4     06-04    131<L>  |  95<L>  |  14.4  ----------------------------<  90  3.8   |  23.0  |  0.77    Ca    9.3      04 Jun 2021 06:15  Mg     1.9     06-03    TPro  7.1  /  Alb  3.6  /  TBili  0.7  /  DBili  0.2  /  AST  123<H>  /  ALT  261<H>  /  AlkPhos  78  06-04          CAPILLARY BLOOD GLUCOSE            RADIOLOGY & ADDITIONAL TESTS:          MEDICATIONS  (STANDING):  ALBUTerol    90 MICROgram(s) HFA Inhaler 2 Puff(s) Inhalation every 6 hours  amLODIPine   Tablet 10 milliGRAM(s) Oral daily  dexAMETHasone  Injectable 6 milliGRAM(s) IV Push daily  heparin  Infusion.  Unit(s)/Hr (24 mL/Hr) IV Continuous <Continuous>  remdesivir  IVPB   IV Intermittent     MEDICATIONS  (PRN):  acetaminophen   Tablet .. 650 milliGRAM(s) Oral every 4 hours PRN Temp greater or equal to 38.5C (101.3F)  acetaminophen  Suppository .. 650 milliGRAM(s) Rectal every 4 hours PRN Temp greater or equal to 38.5C (101.3F)  heparin   Injectable 96081 Unit(s) IV Push every 6 hours PRN For aPTT less than 40  heparin   Injectable 5000 Unit(s) IV Push every 6 hours PRN For aPTT between 40 - 57

## 2021-06-04 NOTE — PROGRESS NOTE ADULT - SUBJECTIVE AND OBJECTIVE BOX
[facility]  INFECTIOUS DISEASES AND INTERNAL MEDICINE at [place]  =======================================================  Jhonathan Roth MD  Diplomates American Board of Internal Medicine and Infectious Diseases  Tel  [phone]  Fax [phone]  =======================================================    [id number]  JANIE HANEY   follow up:  COVID-19 pneumonia, PE    on room air this morning  feeling better  less SOB  no fevers    labs reviewed  LFTs increasing.       I have personally reviewed the labs and data; pertinent labs and data are listed in this note; please see below.   =======================================================  Past Medical & Surgical Hx:  =====================  PAST MEDICAL & SURGICAL HISTORY:  No pertinent past medical history  No significant past surgical history      Problem List:  ==========  HEALTH ISSUES - PROBLEM Dx:        Social Hx:  =======  no toxic habits currently    FAMILY HISTORY:  FH: lymphoma (Father)    no significant family history of immunosuppressive disorders in mother or father   =======================================================    REVIEW OF SYSTEMS:  CONSTITUTIONAL:  as per HPI  HEENT:  No diplopia or blurred vision.  No earache, sore throat or runny nose.  CARDIOVASCULAR:  No pressure, squeezing, strangling, tightness, heaviness or aching about the chest, neck, axilla or epigastrium.  RESPIRATORY:  as per HPI  GASTROINTESTINAL:  No nausea, vomiting or diarrhea.  GENITOURINARY:  No dysuria, frequency or urgency. No Blood in urine  MUSCULOSKELETAL:  no joint aches, no muscle pain  SKIN:  No change in skin, hair or nails.  NEUROLOGIC:  No Headaches, seizures or weakness.  PSYCHIATRIC:  No disorder of thought or mood.  ENDOCRINE:  No heat or cold intolerance  HEMATOLOGICAL:  No easy bruising or bleeding.    =======================================================  Allergies  No Known Allergies     ======================================================  Physical Exam:  ============     General:  No acute distress. OBESE  Eye: Pupils are equal, round and reactive to light, Extraocular movements are intact, Normal conjunctiva.  HENT: Normocephalic, Oral mucosa is moist, No pharyngeal erythema, No sinus tenderness.  Neck: Supple, No lymphadenopathy.  Respiratory: Lungs with coarse breath sounds at bases  Cardiovascular: mild tachycardia  Gastrointestinal: Soft, Non-tender, Non-distended, Normal bowel sounds.  Genitourinary: No costovertebral angle tenderness.  Lymphatics: No lymphadenopathy neck,   Musculoskeletal: Normal range of motion, Normal strength.  Integumentary: No rash.  Neurologic: Alert, Oriented, No focal deficits, Cranial Nerves II-XII are grossly intact.  Psychiatric: Appropriate mood & affect.    =======================================================  Vitals:  ============  T(F): 97.9 (04 Jun 2021 07:50), Max: 98.4 (03 Jun 2021 16:58)  HR: 107 (04 Jun 2021 08:00)  BP: 114/82 (04 Jun 2021 07:50)  RR: 18 (04 Jun 2021 07:50)  SpO2: 91% (04 Jun 2021 08:00) (91% - 95%)  temp max in last 48H T(F): , Max: 98.6 (06-03-21 @ 05:20)    =======================================================  Current Antibiotics:  remdesivir  IVPB   IV Intermittent   remdesivir  IVPB 100 milliGRAM(s) IV Intermittent every 24 hours    Other medications:  ALBUTerol    90 MICROgram(s) HFA Inhaler 2 Puff(s) Inhalation every 6 hours  apixaban 10 milliGRAM(s) Oral every 12 hours  dexAMETHasone  Injectable 6 milliGRAM(s) IV Push daily  dextrose 40% Gel 15 Gram(s) Oral once  dextrose 5%. 1000 milliLiter(s) IV Continuous <Continuous>  dextrose 5%. 1000 milliLiter(s) IV Continuous <Continuous>  dextrose 50% Injectable 25 Gram(s) IV Push once  dextrose 50% Injectable 12.5 Gram(s) IV Push once  dextrose 50% Injectable 25 Gram(s) IV Push once  escitalopram 5 milliGRAM(s) Oral daily  glucagon  Injectable 1 milliGRAM(s) IntraMuscular once  insulin glargine Injectable (LANTUS) 5 Unit(s) SubCutaneous at bedtime  insulin lispro (ADMELOG) corrective regimen sliding scale   SubCutaneous three times a day before meals  insulin lispro Injectable (ADMELOG) 1 Unit(s) SubCutaneous three times a day before meals  lisinopril 5 milliGRAM(s) Oral daily  melatonin 5 milliGRAM(s) Oral at bedtime      =======================================================  Labs:                        12.3   8.78  )-----------( 482      ( 04 Jun 2021 06:15 )             36.4     06-04    131<L>  |  95<L>  |  14.4  ----------------------------<  90  3.8   |  23.0  |  0.77    Ca    9.3      04 Jun 2021 06:15  Mg     1.9     06-03    TPro  7.1  /  Alb  3.6  /  TBili  0.7  /  DBili  0.2  /  AST  123<H>  /  ALT  261<H>  /  AlkPhos  78  06-04  Alkaline Phosphatase, Serum: 78 U/L (06-04-21 @ 06:15)  Alkaline Phosphatase, Serum: 77 U/L (06-04-21 @ 06:15)  Alkaline Phosphatase, Serum: 82 U/L (06-03-21 @ 05:44)  Alkaline Phosphatase, Serum: 82 U/L (06-03-21 @ 05:44)  Alkaline Phosphatase, Serum: 83 U/L (06-02-21 @ 06:59)  Alkaline Phosphatase, Serum: 85 U/L (06-02-21 @ 06:59)  Alkaline Phosphatase, Serum: 86 U/L (06-02-21 @ 01:16)  Alkaline Phosphatase, Serum: 88 U/L (06-01-21 @ 08:57)  Alanine Aminotransferase (ALT/SGPT): 261 U/L (06-04-21 @ 06:15)  Alanine Aminotransferase (ALT/SGPT): 267 U/L (06-04-21 @ 06:15)  Alanine Aminotransferase (ALT/SGPT): 206 U/L (06-03-21 @ 05:44)  Alanine Aminotransferase (ALT/SGPT): 205 U/L (06-03-21 @ 05:44)  Alanine Aminotransferase (ALT/SGPT): 165 U/L (06-02-21 @ 06:59)  Alanine Aminotransferase (ALT/SGPT): 165 U/L (06-02-21 @ 06:59)  Alanine Aminotransferase (ALT/SGPT): 165 U/L (06-02-21 @ 01:16)  Alanine Aminotransferase (ALT/SGPT): 155 U/L (06-01-21 @ 08:57)  Aspartate Aminotransferase (AST/SGOT): 123 U/L (06-04-21 @ 06:15)  Aspartate Aminotransferase (AST/SGOT): 129 U/L (06-04-21 @ 06:15)  Aspartate Aminotransferase (AST/SGOT): 98 U/L (06-03-21 @ 05:44)  Aspartate Aminotransferase (AST/SGOT): 96 U/L (06-03-21 @ 05:44)  Aspartate Aminotransferase (AST/SGOT): 75 U/L (06-02-21 @ 06:59)  Aspartate Aminotransferase (AST/SGOT): 77 U/L (06-02-21 @ 06:59)  Aspartate Aminotransferase (AST/SGOT): 79 U/L (06-02-21 @ 01:16)  Aspartate Aminotransferase (AST/SGOT): 77 U/L (06-01-21 @ 08:57)  Bilirubin Total, Serum: 0.7 mg/dL (06-04-21 @ 06:15)  Bilirubin Total, Serum: 0.7 mg/dL (06-04-21 @ 06:15)  Bilirubin Total, Serum: 0.7 mg/dL (06-03-21 @ 05:44)  Bilirubin Total, Serum: 0.7 mg/dL (06-03-21 @ 05:44)  Bilirubin Total, Serum: 0.8 mg/dL (06-02-21 @ 06:59)  Bilirubin Total, Serum: 0.8 mg/dL (06-02-21 @ 06:59)  Bilirubin Total, Serum: 0.8 mg/dL (06-02-21 @ 01:16)  Bilirubin Total, Serum: 1.3 mg/dL (06-01-21 @ 08:57)  Bilirubin Direct, Serum: 0.2 mg/dL (06-04-21 @ 06:15)  Bilirubin Direct, Serum: 0.2 mg/dL (06-03-21 @ 05:44)  Bilirubin Direct, Serum: 0.1 mg/dL (06-02-21 @ 06:59)  Bilirubin Direct, Serum: 0.3 mg/dL (06-02-21 @ 01:16)      C-Reactive Protein, Serum: 27 mg/L (06-03-21 @ 05:44)  C-Reactive Protein, Serum: 57 mg/L (06-01-21 @ 08:57)    Procalcitonin, Serum: 0.16 ng/mL (06-03-21 @ 05:44)  Procalcitonin, Serum: 0.14 ng/mL (06-01-21 @ 08:57)    COVID-19 PCR: Detected (06-01-21 @ 08:59)      < from: US Duplex Venous Lower Ext Complete, Bilateral (06.01.21 @ 15:59) >     EXAM:  US DPLX LWR EXT VEINS COMPL BI                          PROCEDURE DATE:  06/01/2021          INTERPRETATION:  CLINICAL INFORMATION: Pulmonary embolism. COVID.    COMPARISON: CTA chest of the same day    TECHNIQUE: Duplex sonography of the BILATERAL LOWER extremity veins with color and spectral Doppler, with and without compression.    FINDINGS:    RIGHT:  Normal compressibility of the RIGHT common femoral, femoral and popliteal veins.  Doppler examination shows normal spontaneous and phasic flow.  No RIGHT calf vein thrombosis is detected.    LEFT:  Normal compressibility of the LEFT common femoral, femoral and popliteal veins.  Doppler examination shows normal spontaneous and phasic flow.  No LEFT calf vein thrombosis is detected.    IMPRESSION:  No evidence of deep venous thrombosis in either lower extremity.      JALEN LEON MD; Attending Radiologist  This document has been electronically signed. Jun 1 2021  4:20PM    < end of copied text >        < from: CT Angio Chest PE Protocol w/ IV Cont (06.01.21 @ 12:45) >   EXAM:  CT ANGIO CHEST PULM ART [facility]                        PROCEDURE DATE:  06/01/2021    INTERPRETATION:  CLINICAL INFORMATION: Covid. Tachycardia. Hypoxia.  COMPARISON: Frontal chest radiograph performed the same day.  CONTRAST/COMPLICATIONS:  IV Contrast: Omnipaque 350  60 cc administered   40 cc discarded  Oral Contrast: NONE  Complications: None reported at time of study completion    PROCEDURE:  CT Angiography of the Chest.  Sagittal and coronal reformats were performed as well as 3D (MIP) reconstructions.    FINDINGS:    LUNGS AND AIRWAYS: Patent central airways.  There are extensive coarse consolidative opacities throughout the lungs, most prominent in the lower lobes, compatible with history of Covid pneumonia.  PLEURA: No pleural effusion. No pneumothorax or pneumomediastinum.  MEDIASTINUM AND LASHAE: There are prominent mediastinal and bilateral hilar lymph nodes. There is no axillary lymphadenopathy.  VESSELS: There is a tubular filling defect within a medial branch of the posterior basilar segment artery of the left lower lobe, compatible with pulmonary embolism. No additional filling defect is identified within the first, second, or third order branches of the pulmonary arteries. The pulmonary trunk and main pulmonary arteries are unremarkable. The thoracic aorta and great vessels are unremarkable.  HEART: Heart size is normal. No pericardial effusion.  CHEST WALL AND LOWER NECK: Within normal limits.  VISUALIZED UPPER ABDOMEN: Within normal limits.  BONES: Within normal limits.    IMPRESSION:  1. Segmental pulmonary embolism in the left lower lobe.  2. Extensive coarse consolidative opacities with prominent mediastinal and bilateral hilar lymph nodes, compatible with Covid pneumonia.  3. Results discussed with Dr. Marsh at time of interpretation.     ARJUN CHAMBERLAIN M.D., ATTENDING RADIOLOGIST  This document has been electronically signed. Jun 1 2021 12:53PM    < end of copied text >

## 2021-06-04 NOTE — DISCHARGE NOTE PROVIDER - HOSPITAL COURSE
36yo M w/pmh HTN presents for SOB x3 weeks associated with SOB worse w/ exertion assoc/w productive cough, chest heaviness, generalized weakness with subjective fevers. Denies n/v, abdominal pain. Reports self-isolating since then. Denies known COVID contacts however 4 days prior to symptoms pt went to a restaurant for dinner and says it was busy. In ED found ot have COVID PNA and Left Lower Lobe PE on CT.  Pt admitted for acute hypoxic respiratory failure 2/2 covid pna and provoked PE due to covid.  Pt was initially on supplemental O2 and titrated off as tolerated.  Pt was treated w/ dexamethasone and remdesivir.  LFTs noted to trend up and given pt was no lonter requiring supplemental O2 remdesivir course was d/c'd on day 4.  Pt was initially on heparin gtt for PE but eventually switched to Eliquis.  TTE showed no evidence of right heart strain.  Pts BP was noted to be uncontrolled subsequently placed on lisinopril; pt tolerated well and bp improved.  Sinus tachycardia noted on admission was likely multifactorial 2/2 anxiety, pe, covid pna and improved throughout hospital course without intervention.  A1c noted to be 5.9.  Pt counseled on life style modifications to improve A1c.  Pt declined starting metformin on d/c and rather try life style modifications first and have A1c reassessed by PMD in 3 months.  While in hospital glucose was well controlled w/ insulin.   Pt also reported hx of etoh abuse however not at risk of withdrawal given last drink was 3 weeks prior to admission.  Pt had mx episodes of severe anxiety and psych was consulted.  Pt started on lexapro 5mg and will be referred to psych outpt for continued management.        Time spent on d/c 45min.      34yo M w/pmh HTN presents for SOB x3 weeks associated with SOB worse w/ exertion assoc/w productive cough, chest heaviness, generalized weakness with subjective fevers. Denies n/v, abdominal pain. Reports self-isolating since then. Denies known COVID contacts however 4 days prior to symptoms pt went to a restaurant for dinner and says it was busy. In ED found ot have COVID PNA and Left Lower Lobe PE on CT.  Pt admitted for acute hypoxic respiratory failure 2/2 covid pna and provoked PE due to covid.  Pt was initially on supplemental O2 and titrated off as tolerated.  Pt was treated w/ dexamethasone and remdesivir.  LFTs noted to trend up and given pt was no lonter requiring supplemental O2 remdesivir course was d/c'd on day 4.  Pt was initially on heparin gtt for PE but eventually switched to Eliquis.  TTE showed no evidence of right heart strain.  Pts BP was noted to be uncontrolled subsequently placed on lisinopril; pt tolerated well and bp improved.  Sinus tachycardia noted on admission was likely multifactorial 2/2 anxiety, pe, covid pna and improved throughout hospital course without intervention.  A1c noted to be 5.9.  Pt counseled on life style modifications to improve A1c.  Pt declined starting metformin on d/c and rather try life style modifications first and have A1c reassessed by PMD in 3 months.  While in hospital glucose was well controlled w/ insulin.   Pt also reported hx of etoh abuse however not at risk of withdrawal given last drink was 3 weeks prior to admission.  Pt had mx episodes of severe anxiety,paranoid  and psych was consulted.  meds per psych pt is cleared by psych to discharge home. and will be referred to psych outpt for continued management. f/u hematology OUT PT.  pt is breathing RA .Ambulating w/o any device.  so2 stable during ambulation.              PHYSICAL EXAM:  Vital Signs Last 24 Hrs  T(C): 36.6 (11 Jun 2021 15:19), Max: 36.7 (11 Jun 2021 05:44)  T(F): 97.9 (11 Jun 2021 15:19), Max: 98 (11 Jun 2021 05:44)  HR: 105 (11 Jun 2021 15:19) (92 - 119)  BP: 121/84 (11 Jun 2021 15:19) (121/84 - 138/88)  BP(mean): 105 (10 Adin 2021 23:00) (105 - 105)  RR: 20 (11 Jun 2021 15:19) (18 - 26)  SpO2: 94% (11 Jun 2021 15:19) (91% - 97%)    CONSTITUTIONAL: NAD,  EYES: PERRLA; conjunctiva and sclera clear  ENMT: Moist oral mucosa,   RESPIRATORY: Normal respiratory effort; lungs are clear to auscultation bilaterally  CARDIOVASCULAR: Regular rate and rhythm, normal S1 and S2, no murmur   EXTS: No lower extremity edema; Peripheral pulses are 2+ bilaterally  ABDOMEN: Nontender to palpation, normoactive bowel sounds, no rebound/guarding;   MUSCLOSKELETAL:    no joint swelling or tenderness to palpation  PSYCH: calm coop  NEUROLOGY: A+O to person, place, and time;no focal deficits;     Time spent>35 mins       34yo M w/pmh HTN presents for SOB x3 weeks associated with SOB worse w/ exertion assoc/w productive cough, chest heaviness, generalized weakness with subjective fevers. Denies n/v, abdominal pain. Reports self-isolating since then. Denies known COVID contacts however 4 days prior to symptoms pt went to a restaurant for dinner and says it was busy. In ED found ot have COVID PNA and Left Lower Lobe PE on CT.  Pt admitted for acute hypoxic respiratory failure 2/2 covid pna and provoked PE due to covid.  Pt was initially on supplemental O2 and titrated off as tolerated.  Pt was treated w/ dexamethasone and remdesivir.  LFTs noted to trend up and given pt was no lonter requiring supplemental O2 remdesivir course was d/c'd for elevated lfts..  Pt was initially on heparin gtt for PE but eventually switched to Eliquis.  TTE showed no evidence of right heart strain.  Pts BP was noted to be uncontrolled subsequently placed on lisinopril; pt tolerated well and bp improved.  Sinus tachycardia noted on admission was likely multifactorial 2/2 anxiety, pe, covid pna and improved throughout hospital course without intervention.  A1c noted to be 5.9.  Pt counseled on life style modifications to improve A1c.  Pt declined starting any meds for BG  and will try life style modifications first and have A1c reassessed by PMD in 3 months.  While in hospital glucose was well controlled w/ insulin.   Pt also reported hx of etoh abuse however not at risk of withdrawal given last drink was 3 weeks prior to admission.  Pt had mx episodes of severe anxiety,paranoid  and psych was consulted.  meds per psych pt is cleared by psych to discharge home. and will be referred to psych outpt for continued management. f/u hematology OUT PT.  pt is breathing RA .Ambulating w/o any device.  so2 stable during ambulation.    time spent>35 mins          PHYSICAL EXAM:  Vital Signs Last 24 Hrs  T(C): 36.6 (11 Jun 2021 15:19), Max: 36.7 (11 Jun 2021 05:44)  T(F): 97.9 (11 Jun 2021 15:19), Max: 98 (11 Jun 2021 05:44)  HR: 105 (11 Jun 2021 15:19) (92 - 119)  BP: 121/84 (11 Jun 2021 15:19) (121/84 - 138/88)  BP(mean): 105 (10 Adin 2021 23:00) (105 - 105)  RR: 20 (11 Jun 2021 15:19) (18 - 26)  SpO2: 94% (11 Jun 2021 15:19) (91% - 97%)    CONSTITUTIONAL: NAD,  EYES: PERRLA; conjunctiva and sclera clear  ENMT: Moist oral mucosa,     RESPIRATORY: Normal respiratory effort; lungs are clear to auscultation bilaterally  CARDIOVASCULAR: Regular rate and rhythm, normal S1 and S2, no murmur   EXTS: No lower extremity edema; Peripheral pulses are 2+ bilaterally  ABDOMEN: Nontender to palpation, normoactive bowel sounds, no rebound/guarding;   MUSCLOSKELETAL:    no joint swelling or tenderness to palpation  PSYCH: calm coop  NEUROLOGY: A+O to person, place, and time;no focal deficits;     Time spent>35 mins

## 2021-06-04 NOTE — DISCHARGE NOTE PROVIDER - NSFOLLOWUPCLINICS_GEN_ALL_ED_FT
[facility]  [address] [place] [address]  Phone: [phone]  Fax:   Follow Up Time: 1 week

## 2021-06-05 LAB
ALBUMIN SERPL ELPH-MCNC: 3.5 G/DL — SIGNIFICANT CHANGE UP (ref 3.3–5.2)
ALBUMIN SERPL ELPH-MCNC: 3.5 G/DL — SIGNIFICANT CHANGE UP (ref 3.3–5.2)
ALP SERPL-CCNC: 85 U/L — SIGNIFICANT CHANGE UP (ref 40–120)
ALP SERPL-CCNC: 88 U/L — SIGNIFICANT CHANGE UP (ref 40–120)
ALT FLD-CCNC: 353 U/L — HIGH
ALT FLD-CCNC: 354 U/L — HIGH
ANION GAP SERPL CALC-SCNC: 12 MMOL/L — SIGNIFICANT CHANGE UP (ref 5–17)
AST SERPL-CCNC: 172 U/L — HIGH
AST SERPL-CCNC: 172 U/L — HIGH
BASOPHILS # BLD AUTO: 0.04 K/UL — SIGNIFICANT CHANGE UP (ref 0–0.2)
BASOPHILS NFR BLD AUTO: 0.5 % — SIGNIFICANT CHANGE UP (ref 0–2)
BILIRUB DIRECT SERPL-MCNC: 0.2 MG/DL — SIGNIFICANT CHANGE UP (ref 0–0.3)
BILIRUB INDIRECT FLD-MCNC: 0.4 MG/DL — SIGNIFICANT CHANGE UP (ref 0.2–1)
BILIRUB SERPL-MCNC: 0.6 MG/DL — SIGNIFICANT CHANGE UP (ref 0.4–2)
BILIRUB SERPL-MCNC: 0.6 MG/DL — SIGNIFICANT CHANGE UP (ref 0.4–2)
BUN SERPL-MCNC: 10.6 MG/DL — SIGNIFICANT CHANGE UP (ref 8–20)
CALCIUM SERPL-MCNC: 9.2 MG/DL — SIGNIFICANT CHANGE UP (ref 8.6–10.2)
CHLORIDE SERPL-SCNC: 98 MMOL/L — SIGNIFICANT CHANGE UP (ref 98–107)
CO2 SERPL-SCNC: 22 MMOL/L — SIGNIFICANT CHANGE UP (ref 22–29)
CREAT SERPL-MCNC: 0.69 MG/DL — SIGNIFICANT CHANGE UP (ref 0.5–1.3)
CRP SERPL-MCNC: 15 MG/L — HIGH
D DIMER BLD IA.RAPID-MCNC: 520 NG/ML DDU — HIGH
EOSINOPHIL # BLD AUTO: 0.02 K/UL — SIGNIFICANT CHANGE UP (ref 0–0.5)
EOSINOPHIL NFR BLD AUTO: 0.2 % — SIGNIFICANT CHANGE UP (ref 0–6)
FERRITIN SERPL-MCNC: 1245 NG/ML — HIGH (ref 30–400)
GLUCOSE BLDC GLUCOMTR-MCNC: 117 MG/DL — HIGH (ref 70–99)
GLUCOSE BLDC GLUCOMTR-MCNC: 118 MG/DL — HIGH (ref 70–99)
GLUCOSE BLDC GLUCOMTR-MCNC: 139 MG/DL — HIGH (ref 70–99)
GLUCOSE BLDC GLUCOMTR-MCNC: 97 MG/DL — SIGNIFICANT CHANGE UP (ref 70–99)
GLUCOSE BLDC GLUCOMTR-MCNC: 98 MG/DL — SIGNIFICANT CHANGE UP (ref 70–99)
GLUCOSE SERPL-MCNC: 128 MG/DL — HIGH (ref 70–99)
HCT VFR BLD CALC: 39 % — SIGNIFICANT CHANGE UP (ref 39–50)
HGB BLD-MCNC: 13.1 G/DL — SIGNIFICANT CHANGE UP (ref 13–17)
IMM GRANULOCYTES NFR BLD AUTO: 1 % — SIGNIFICANT CHANGE UP (ref 0–1.5)
INR BLD: 1.45 RATIO — HIGH (ref 0.88–1.16)
LDH SERPL L TO P-CCNC: 364 U/L — HIGH (ref 98–192)
LYMPHOCYTES # BLD AUTO: 1.5 K/UL — SIGNIFICANT CHANGE UP (ref 1–3.3)
LYMPHOCYTES # BLD AUTO: 17.3 % — SIGNIFICANT CHANGE UP (ref 13–44)
MCHC RBC-ENTMCNC: 30.5 PG — SIGNIFICANT CHANGE UP (ref 27–34)
MCHC RBC-ENTMCNC: 33.6 GM/DL — SIGNIFICANT CHANGE UP (ref 32–36)
MCV RBC AUTO: 90.7 FL — SIGNIFICANT CHANGE UP (ref 80–100)
MONOCYTES # BLD AUTO: 0.76 K/UL — SIGNIFICANT CHANGE UP (ref 0–0.9)
MONOCYTES NFR BLD AUTO: 8.8 % — SIGNIFICANT CHANGE UP (ref 2–14)
NEUTROPHILS # BLD AUTO: 6.25 K/UL — SIGNIFICANT CHANGE UP (ref 1.8–7.4)
NEUTROPHILS NFR BLD AUTO: 72.2 % — SIGNIFICANT CHANGE UP (ref 43–77)
PLATELET # BLD AUTO: 434 K/UL — HIGH (ref 150–400)
POTASSIUM SERPL-MCNC: 4.1 MMOL/L — SIGNIFICANT CHANGE UP (ref 3.5–5.3)
POTASSIUM SERPL-SCNC: 4.1 MMOL/L — SIGNIFICANT CHANGE UP (ref 3.5–5.3)
PROCALCITONIN SERPL-MCNC: 0.1 NG/ML — SIGNIFICANT CHANGE UP (ref 0.02–0.1)
PROT SERPL-MCNC: 7.3 G/DL — SIGNIFICANT CHANGE UP (ref 6.6–8.7)
PROT SERPL-MCNC: 7.3 G/DL — SIGNIFICANT CHANGE UP (ref 6.6–8.7)
PROTHROM AB SERPL-ACNC: 16.5 SEC — HIGH (ref 10.6–13.6)
RBC # BLD: 4.3 M/UL — SIGNIFICANT CHANGE UP (ref 4.2–5.8)
RBC # FLD: 12.1 % — SIGNIFICANT CHANGE UP (ref 10.3–14.5)
SODIUM SERPL-SCNC: 132 MMOL/L — LOW (ref 135–145)
WBC # BLD: 8.66 K/UL — SIGNIFICANT CHANGE UP (ref 3.8–10.5)
WBC # FLD AUTO: 8.66 K/UL — SIGNIFICANT CHANGE UP (ref 3.8–10.5)

## 2021-06-05 PROCEDURE — 99232 SBSQ HOSP IP/OBS MODERATE 35: CPT

## 2021-06-05 RX ADMIN — Medication 5 MILLIGRAM(S): at 21:23

## 2021-06-05 RX ADMIN — ALBUTEROL 2 PUFF(S): 90 AEROSOL, METERED ORAL at 15:02

## 2021-06-05 RX ADMIN — Medication 1 UNIT(S): at 17:30

## 2021-06-05 RX ADMIN — LISINOPRIL 5 MILLIGRAM(S): 2.5 TABLET ORAL at 05:24

## 2021-06-05 RX ADMIN — Medication 1 UNIT(S): at 12:19

## 2021-06-05 RX ADMIN — Medication 0.5 MILLIGRAM(S): at 08:29

## 2021-06-05 RX ADMIN — ALBUTEROL 2 PUFF(S): 90 AEROSOL, METERED ORAL at 08:31

## 2021-06-05 RX ADMIN — APIXABAN 10 MILLIGRAM(S): 2.5 TABLET, FILM COATED ORAL at 05:24

## 2021-06-05 RX ADMIN — Medication 1 UNIT(S): at 08:08

## 2021-06-05 RX ADMIN — Medication 200 MILLIGRAM(S): at 08:29

## 2021-06-05 RX ADMIN — ESCITALOPRAM OXALATE 5 MILLIGRAM(S): 10 TABLET, FILM COATED ORAL at 12:20

## 2021-06-05 RX ADMIN — Medication 0.5 MILLIGRAM(S): at 21:31

## 2021-06-05 RX ADMIN — Medication 6 MILLIGRAM(S): at 05:24

## 2021-06-05 RX ADMIN — INSULIN GLARGINE 5 UNIT(S): 100 INJECTION, SOLUTION SUBCUTANEOUS at 21:23

## 2021-06-05 RX ADMIN — APIXABAN 10 MILLIGRAM(S): 2.5 TABLET, FILM COATED ORAL at 17:30

## 2021-06-05 NOTE — PROVIDER CONTACT NOTE (OTHER) - BACKGROUND
to scare him. explained to patient that I could see the nursing assistant at the opposite end of the grant in front of a different room.
COVID PNA, LLL PE. Found to have PTSD and anxiety disorder. Started on lexapro today. s/p remdesivir, heparin gtt, solumedrol and eliquis started during stay.

## 2021-06-05 NOTE — PROGRESS NOTE ADULT - SUBJECTIVE AND OBJECTIVE BOX
Patient is a 35y old  Male who presents with a chief complaint of SOB and fevers COVID PNA and PE (04 Jun 2021 16:24)    Pt seen and exam. sob during ambulation. Breathing stable at rest at RA 92%  Per RN - SO2 DROPPED down to 84% during ambulation RA  Pt has mild cough, denies cp,fever,chill,dizziness.    SUBJECTIVE / OVERNIGHT EVENTS: Nightmare-not present now.  REVIEW OF SYSTEMS: All systems are reviewed and found to be negative except above    MEDICATIONS  (STANDING):  ALBUTerol    90 MICROgram(s) HFA Inhaler 2 Puff(s) Inhalation every 6 hours  apixaban 10 milliGRAM(s) Oral every 12 hours  dexAMETHasone  Injectable 6 milliGRAM(s) IV Push daily  dextrose 40% Gel 15 Gram(s) Oral once  dextrose 5%. 1000 milliLiter(s) (50 mL/Hr) IV Continuous <Continuous>  dextrose 5%. 1000 milliLiter(s) (100 mL/Hr) IV Continuous <Continuous>  dextrose 50% Injectable 25 Gram(s) IV Push once  dextrose 50% Injectable 12.5 Gram(s) IV Push once  dextrose 50% Injectable 25 Gram(s) IV Push once  escitalopram 5 milliGRAM(s) Oral daily  glucagon  Injectable 1 milliGRAM(s) IntraMuscular once  insulin glargine Injectable (LANTUS) 5 Unit(s) SubCutaneous at bedtime  insulin lispro (ADMELOG) corrective regimen sliding scale   SubCutaneous three times a day before meals  insulin lispro Injectable (ADMELOG) 1 Unit(s) SubCutaneous three times a day before meals  lisinopril 5 milliGRAM(s) Oral daily  melatonin 5 milliGRAM(s) Oral at bedtime    MEDICATIONS  (PRN):  acetaminophen   Tablet .. 650 milliGRAM(s) Oral every 4 hours PRN Temp greater or equal to 38.5C (101.3F)  acetaminophen  Suppository .. 650 milliGRAM(s) Rectal every 4 hours PRN Temp greater or equal to 38.5C (101.3F)  benzonatate 100 milliGRAM(s) Oral every 8 hours PRN Cough  guaiFENesin Oral Liquid (Sugar-Free) 200 milliGRAM(s) Oral every 6 hours PRN Cough  LORazepam     Tablet 0.5 milliGRAM(s) Oral two times a day PRN Anxiety      CAPILLARY BLOOD GLUCOSE      POCT Blood Glucose.: 118 mg/dL (05 Jun 2021 08:01)  POCT Blood Glucose.: 98 mg/dL (05 Jun 2021 01:18)  POCT Blood Glucose.: 96 mg/dL (04 Jun 2021 21:29)  POCT Blood Glucose.: 99 mg/dL (04 Jun 2021 20:05)  POCT Blood Glucose.: 114 mg/dL (04 Jun 2021 17:52)  POCT Blood Glucose.: 118 mg/dL (04 Jun 2021 12:43)    I&O's Summary      PHYSICAL EXAM:  Vital Signs Last 24 Hrs  T(C): 36.4 (05 Jun 2021 09:36), Max: 37.1 (04 Jun 2021 17:12)  T(F): 97.6 (05 Jun 2021 09:36), Max: 98.7 (04 Jun 2021 17:12)  HR: 106 (05 Jun 2021 09:36) (97 - 114)  BP: 143/89 (05 Jun 2021 05:19) (132/89 - 143/89)  BP(mean): --  RR: 20 (05 Jun 2021 09:36) (18 - 20)  SpO2: 94% (05 Jun 2021 09:36) (90% - 95%)    CONSTITUTIONAL: NAD,  EYES: PERRLA; conjunctiva and sclera clear  ENMT: Moist oral mucosa,   RESPIRATORY: Normal respiratory effort; mild crackle  to auscultation bilaterally  CARDIOVASCULAR: Regular rate and rhythm, normal S1 and S2, no murmur   EXTS: trace lower extremity edema; Peripheral pulses are 2+ bilaterally  ABDOMEN: Nontender to palpation, normoactive bowel sounds, no rebound/guarding;   MUSCLOSKELETAL:   no clubbing or cyanosis of digits; no joint swelling or tenderness to palpation  PSYCH: affect appropriate  NEUROLOGY: A+O to person, place, and time;  no gross neuro deficits;       LABS:                        13.1   8.66  )-----------( 434      ( 05 Jun 2021 07:46 )             39.0     06-05    132<L>  |  98  |  10.6  ----------------------------<  128<H>  4.1   |  22.0  |  0.69    Ca    9.2      05 Jun 2021 07:46    TPro  7.3  /  Alb  3.5  /  TBili  0.6  /  DBili  0.2  /  AST  172<H>  /  ALT  354<H>  /  AlkPhos  88  06-05    PT/INR - ( 05 Jun 2021 07:46 )   PT: 16.5 sec;   INR: 1.45 ratio                     RADIOLOGY & ADDITIONAL TESTS:  Results Reviewed:   Imaging Personally Reviewed:  Electrocardiogram Personally Reviewed:    COORDINATION OF CARE:  Care Discussed with Consultants/Other Providers [Y/N]:  Prior or Outpatient Records Reviewed [Y/N]:

## 2021-06-05 NOTE — PROVIDER CONTACT NOTE (OTHER) - ACTION/TREATMENT ORDERED:
No new medications ordered
continue to monitor, endorse to day team. pt suffering from nightmares
1:1 order placed for patient's safety

## 2021-06-05 NOTE — CHART NOTE - NSCHARTNOTEFT_GEN_A_CORE
Contacted by RN for pt found confused and with concern for ?auditory hallucinations   Patient seen and examined at bedside with RN -- pt states "now I know it was just a dream"  Pt states that he woke up from a dream and could not tell if he was awake or asleep, describes what he now calls a vivid dream where he heard laughter  Of note he was started on lexapro earlier today, also received ativan and melatonin tonight    Denies headache, visual changes, dizziness, lightheadedness, paresthesias, CP, SOB, abd pain, n/v, anxiety, tactile/visual/auditory disturbances    Vital Signs Last 24 Hrs  T(C): 36.6 (05 Jun 2021 01:57), Max: 37.1 (04 Jun 2021 17:12)  T(F): 97.8 (05 Jun 2021 01:57), Max: 98.7 (04 Jun 2021 17:12)  HR: 97 (05 Jun 2021 01:57) (94 - 114)  BP: 132/89 (05 Jun 2021 01:57) (114/82 - 141/87)  BP(mean): --  RR: 20 (05 Jun 2021 01:57) (16 - 20)  SpO2: 95% (05 Jun 2021 01:57) (90% - 95%)    Physical Exam:  Gen: AOx3, sitting in bed comfortably in NAD  CV: RRR, +S1S2  Lungs: CTA b/l, unlabored respirations on RA/NC  Abd: +bowel sounds, soft/nt/nd  Ext: MAEx4, without edema  Neuro: nonfocal. PERRL, speech clear, face symmetrical, CN 2-12 grossly intact. Strength 5/5 b/l upper and lower extremities. sensation intact b/l upper and lower extremities. no tremor or pronator drift.     A/P: nightmare   pt back to baseline status, states he knows it was a dream, reassurance provided   pt with known PE, EtOH abuse, on IV steroids; also on ddx - considered stroke vs etoh w/d vs steroid-induced psychosis   - pt is without any appreciable neurologic defect at this time -- defer CTH at this time   - per pt last drink was "three weeks ago" -- no s/sx of withdrawal at this time  - now back to baseline MS  RN to closely monitor, assess and escalate to PA PRN.  Will continue to monitor. Contacted by RN for pt found confused and with concern for ?auditory hallucinations   Patient seen and examined at bedside with RN -- pt states "now I know it was just a dream"  Pt states that he woke up from a dream and could not tell if he was awake or asleep, describes what he now calls a vivid dream where he heard laughter  Of note he was started on lexapro earlier today, also received ativan and melatonin tonight    Denies headache, visual changes, dizziness, lightheadedness, paresthesias, CP, SOB, abd pain, n/v, anxiety, tactile/visual/auditory disturbances    Vital Signs Last 24 Hrs  T(C): 36.6 (05 Jun 2021 01:57), Max: 37.1 (04 Jun 2021 17:12)  T(F): 97.8 (05 Jun 2021 01:57), Max: 98.7 (04 Jun 2021 17:12)  HR: 97 (05 Jun 2021 01:57) (94 - 114)  BP: 132/89 (05 Jun 2021 01:57) (114/82 - 141/87)  BP(mean): --  RR: 20 (05 Jun 2021 01:57) (16 - 20)  SpO2: 95% (05 Jun 2021 01:57) (90% - 95%)    Physical Exam:  Gen: AOx3, sitting in bed comfortably in NAD  CV: RRR, +S1S2  Lungs: CTA b/l, unlabored respirations on RA/NC  Abd: +bowel sounds, soft/nt/nd  Ext: MAEx4, without edema  Neuro: nonfocal. PERRL, speech clear, face symmetrical, CN 2-12 grossly intact. Strength 5/5 b/l upper and lower extremities. sensation intact b/l upper and lower extremities. no tremor or pronator drift.     A/P: nightmare   pt back to baseline status with no focal neuro defecits states he knows it was a dream, reassurance provided   episode likely medication-related   pt with known PE, EtOH abuse, on IV steroids; also on ddx - considered neuro event vs etoh w/d vs steroid-induced psychosis   - pt is without any appreciable neurologic defecit -- CTH not indicated at this time   - per pt last drink was "three weeks ago" -- no s/sx of withdrawal at this time  - now back to baseline MS  RN to closely monitor, assess and escalate to PA PRN.  Will continue to monitor.

## 2021-06-05 NOTE — PROVIDER CONTACT NOTE (OTHER) - ASSESSMENT
Pt now at baseline mental status, is A&Ox4, no neurological deficits noted on exam. VSS as per flowscharles, BS 98.
pt oriented x4. after discussion, pt recognized that he was having hallucinations

## 2021-06-05 NOTE — PROVIDER CONTACT NOTE (OTHER) - SITUATION
Pt noted with elevated BP, was previously given IV hydralazine down in the ED
pt expressing paranoid delusions, stating that he heard a nursing assistant say he was going to choke the patient. patient began describing what he thought was the nursing assistant punching the walls
Pt confused, stated he is [place] and body temperature 70 degrees. States he ambulated to bathroom where he heard laughter. Pt found in bed, in cold sweat, trembling. Now stating it was a dream

## 2021-06-05 NOTE — PROGRESS NOTE ADULT - ASSESSMENT
36yo M w/pmh HTN presents for SOB x3 weeks associated with SOB worse w/ exertion assoc/w productive cough, chest heaviness, generalized weakness with subjective fevers. Denies n/v, abdominal pain. Reports self-isolating since then. Denies known COVID contacts however 4 days prior to symptoms pt went to a restaurant for dinner and says it was busy. In ED found ot have COVID PNA and Left Lower Lobe PE on CT.      Acute hypoxic respiratory failure 2/2 COVID 19 PNA and LLL PE    - c/w supplemental O2 as needed to maintain O2 sat >90%  - c/w bronchodilators incentive spirometry and frequent proning  - dexamethasone 6mg IV daily and hold remdesivir for transaminitis   - Monitor LFTs . cmp  - Inflammatory markers q48h    -F/u  ID rec      Provoked acute Left Lower Lobe Pulmonary Embolism  - Likely 2/2 COVID 19  - continue  eliquis started 05/03   - TTE noted.  No evidence of Rt heart strain      Hypokalemia  - Supplemented  - Will monitor electrolytes and supplement as needed  - Goal K~4      HTN  - c/w Lisinopril 5mg qd   - Monitor BP      Sinus tachycardia  - Improving  - Multifactorial 2/2 COVID PNA, PE, anxiety       DM II  - A1c 5.9  - ISS and hypoglycemic protocol on board       Hx of ETOH abuse  - Last drink 3 weeks ago so no risk of withdrawal at this time  - Counseled on abstaining from etoh use      Anxiety/depression  - Started on Lexapro 5mg per psych recs 06/04, had night mare last night   - f/u  psych rec      VTE: eliquis     Dispo:  Anticipate d/c home in 1-2 days ,likely need home o2  Care of plan dw pt  moris rn

## 2021-06-06 LAB
ALBUMIN SERPL ELPH-MCNC: 3.2 G/DL — LOW (ref 3.3–5.2)
ALBUMIN SERPL ELPH-MCNC: 3.2 G/DL — LOW (ref 3.3–5.2)
ALP SERPL-CCNC: 80 U/L — SIGNIFICANT CHANGE UP (ref 40–120)
ALP SERPL-CCNC: 80 U/L — SIGNIFICANT CHANGE UP (ref 40–120)
ALT FLD-CCNC: 326 U/L — HIGH
ALT FLD-CCNC: 328 U/L — HIGH
ANION GAP SERPL CALC-SCNC: 9 MMOL/L — SIGNIFICANT CHANGE UP (ref 5–17)
AST SERPL-CCNC: 121 U/L — HIGH
AST SERPL-CCNC: 128 U/L — HIGH
BASOPHILS # BLD AUTO: 0.04 K/UL — SIGNIFICANT CHANGE UP (ref 0–0.2)
BASOPHILS NFR BLD AUTO: 0.5 % — SIGNIFICANT CHANGE UP (ref 0–2)
BILIRUB DIRECT SERPL-MCNC: 0.1 MG/DL — SIGNIFICANT CHANGE UP (ref 0–0.3)
BILIRUB INDIRECT FLD-MCNC: 0.5 MG/DL — SIGNIFICANT CHANGE UP (ref 0.2–1)
BILIRUB SERPL-MCNC: 0.6 MG/DL — SIGNIFICANT CHANGE UP (ref 0.4–2)
BILIRUB SERPL-MCNC: 0.6 MG/DL — SIGNIFICANT CHANGE UP (ref 0.4–2)
BUN SERPL-MCNC: 9.3 MG/DL — SIGNIFICANT CHANGE UP (ref 8–20)
CALCIUM SERPL-MCNC: 9 MG/DL — SIGNIFICANT CHANGE UP (ref 8.6–10.2)
CHLORIDE SERPL-SCNC: 101 MMOL/L — SIGNIFICANT CHANGE UP (ref 98–107)
CO2 SERPL-SCNC: 27 MMOL/L — SIGNIFICANT CHANGE UP (ref 22–29)
CREAT SERPL-MCNC: 0.69 MG/DL — SIGNIFICANT CHANGE UP (ref 0.5–1.3)
EOSINOPHIL # BLD AUTO: 0.05 K/UL — SIGNIFICANT CHANGE UP (ref 0–0.5)
EOSINOPHIL NFR BLD AUTO: 0.6 % — SIGNIFICANT CHANGE UP (ref 0–6)
GLUCOSE BLDC GLUCOMTR-MCNC: 111 MG/DL — HIGH (ref 70–99)
GLUCOSE BLDC GLUCOMTR-MCNC: 121 MG/DL — HIGH (ref 70–99)
GLUCOSE BLDC GLUCOMTR-MCNC: 128 MG/DL — HIGH (ref 70–99)
GLUCOSE SERPL-MCNC: 99 MG/DL — SIGNIFICANT CHANGE UP (ref 70–99)
HCT VFR BLD CALC: 37.4 % — LOW (ref 39–50)
HGB BLD-MCNC: 12.7 G/DL — LOW (ref 13–17)
IMM GRANULOCYTES NFR BLD AUTO: 1.1 % — SIGNIFICANT CHANGE UP (ref 0–1.5)
INR BLD: 1.37 RATIO — HIGH (ref 0.88–1.16)
LYMPHOCYTES # BLD AUTO: 2.4 K/UL — SIGNIFICANT CHANGE UP (ref 1–3.3)
LYMPHOCYTES # BLD AUTO: 27.3 % — SIGNIFICANT CHANGE UP (ref 13–44)
MCHC RBC-ENTMCNC: 30.7 PG — SIGNIFICANT CHANGE UP (ref 27–34)
MCHC RBC-ENTMCNC: 34 GM/DL — SIGNIFICANT CHANGE UP (ref 32–36)
MCV RBC AUTO: 90.3 FL — SIGNIFICANT CHANGE UP (ref 80–100)
MONOCYTES # BLD AUTO: 1.05 K/UL — HIGH (ref 0–0.9)
MONOCYTES NFR BLD AUTO: 12 % — SIGNIFICANT CHANGE UP (ref 2–14)
NEUTROPHILS # BLD AUTO: 5.14 K/UL — SIGNIFICANT CHANGE UP (ref 1.8–7.4)
NEUTROPHILS NFR BLD AUTO: 58.5 % — SIGNIFICANT CHANGE UP (ref 43–77)
PLATELET # BLD AUTO: 400 K/UL — SIGNIFICANT CHANGE UP (ref 150–400)
POTASSIUM SERPL-MCNC: 4.2 MMOL/L — SIGNIFICANT CHANGE UP (ref 3.5–5.3)
POTASSIUM SERPL-SCNC: 4.2 MMOL/L — SIGNIFICANT CHANGE UP (ref 3.5–5.3)
PROT SERPL-MCNC: 6.5 G/DL — LOW (ref 6.6–8.7)
PROT SERPL-MCNC: 6.6 G/DL — SIGNIFICANT CHANGE UP (ref 6.6–8.7)
PROTHROM AB SERPL-ACNC: 15.7 SEC — HIGH (ref 10.6–13.6)
RBC # BLD: 4.14 M/UL — LOW (ref 4.2–5.8)
RBC # FLD: 12.1 % — SIGNIFICANT CHANGE UP (ref 10.3–14.5)
SODIUM SERPL-SCNC: 137 MMOL/L — SIGNIFICANT CHANGE UP (ref 135–145)
WBC # BLD: 8.78 K/UL — SIGNIFICANT CHANGE UP (ref 3.8–10.5)
WBC # FLD AUTO: 8.78 K/UL — SIGNIFICANT CHANGE UP (ref 3.8–10.5)

## 2021-06-06 PROCEDURE — 99232 SBSQ HOSP IP/OBS MODERATE 35: CPT

## 2021-06-06 RX ADMIN — Medication 6 MILLIGRAM(S): at 05:05

## 2021-06-06 RX ADMIN — ALBUTEROL 2 PUFF(S): 90 AEROSOL, METERED ORAL at 08:13

## 2021-06-06 RX ADMIN — APIXABAN 10 MILLIGRAM(S): 2.5 TABLET, FILM COATED ORAL at 05:05

## 2021-06-06 RX ADMIN — ALBUTEROL 2 PUFF(S): 90 AEROSOL, METERED ORAL at 20:00

## 2021-06-06 RX ADMIN — APIXABAN 10 MILLIGRAM(S): 2.5 TABLET, FILM COATED ORAL at 17:52

## 2021-06-06 RX ADMIN — Medication 200 MILLIGRAM(S): at 22:06

## 2021-06-06 RX ADMIN — Medication 0.5 MILLIGRAM(S): at 21:45

## 2021-06-06 RX ADMIN — LISINOPRIL 5 MILLIGRAM(S): 2.5 TABLET ORAL at 05:05

## 2021-06-06 RX ADMIN — Medication 1 UNIT(S): at 13:03

## 2021-06-06 RX ADMIN — Medication 1 UNIT(S): at 08:39

## 2021-06-06 RX ADMIN — INSULIN GLARGINE 5 UNIT(S): 100 INJECTION, SOLUTION SUBCUTANEOUS at 21:46

## 2021-06-06 NOTE — PROGRESS NOTE ADULT - SUBJECTIVE AND OBJECTIVE BOX
Patient is a 35y old  Male who presents with a chief complaint of SOB and fevers COVID PNA and PE (05 Jun 2021 12:09)    Pt seen and exam. Pt is AAOX3 BUT c/o hearing voice. pt is stating that he heard a nursing assistant (Shailesh) say he was going to choke the patient. Pt states he does not feel safe to go home.   Denies cp,sob. has mild cough. sob during exertion       REVIEW OF SYSTEMS: All systems are reviewed and found to be negative except above    MEDICATIONS  (STANDING):  ALBUTerol    90 MICROgram(s) HFA Inhaler 2 Puff(s) Inhalation every 6 hours  apixaban 10 milliGRAM(s) Oral every 12 hours  dexAMETHasone  Injectable 6 milliGRAM(s) IV Push daily  dextrose 40% Gel 15 Gram(s) Oral once  dextrose 5%. 1000 milliLiter(s) (50 mL/Hr) IV Continuous <Continuous>  dextrose 5%. 1000 milliLiter(s) (100 mL/Hr) IV Continuous <Continuous>  dextrose 50% Injectable 25 Gram(s) IV Push once  dextrose 50% Injectable 12.5 Gram(s) IV Push once  dextrose 50% Injectable 25 Gram(s) IV Push once  glucagon  Injectable 1 milliGRAM(s) IntraMuscular once  insulin glargine Injectable (LANTUS) 5 Unit(s) SubCutaneous at bedtime  insulin lispro (ADMELOG) corrective regimen sliding scale   SubCutaneous three times a day before meals  insulin lispro Injectable (ADMELOG) 1 Unit(s) SubCutaneous three times a day before meals  lisinopril 5 milliGRAM(s) Oral daily  melatonin 5 milliGRAM(s) Oral at bedtime    MEDICATIONS  (PRN):  acetaminophen   Tablet .. 650 milliGRAM(s) Oral every 4 hours PRN Temp greater or equal to 38.5C (101.3F)  acetaminophen  Suppository .. 650 milliGRAM(s) Rectal every 4 hours PRN Temp greater or equal to 38.5C (101.3F)  benzonatate 100 milliGRAM(s) Oral every 8 hours PRN Cough  guaiFENesin Oral Liquid (Sugar-Free) 200 milliGRAM(s) Oral every 6 hours PRN Cough  LORazepam     Tablet 0.5 milliGRAM(s) Oral two times a day PRN Anxiety      CAPILLARY BLOOD GLUCOSE      POCT Blood Glucose.: 128 mg/dL (06 Jun 2021 07:52)  POCT Blood Glucose.: 97 mg/dL (05 Jun 2021 21:17)  POCT Blood Glucose.: 139 mg/dL (05 Jun 2021 16:51)  POCT Blood Glucose.: 117 mg/dL (05 Jun 2021 12:08)    I&O's Summary    05 Jun 2021 07:01  -  06 Jun 2021 07:00  --------------------------------------------------------  IN: 360 mL / OUT: 2050 mL / NET: -1690 mL    06 Jun 2021 07:01  -  06 Jun 2021 09:46  --------------------------------------------------------  IN: 200 mL / OUT: 600 mL / NET: -400 mL        PHYSICAL EXAM:  Vital Signs Last 24 Hrs  T(C): 35.9 (06 Jun 2021 08:00), Max: 37 (06 Jun 2021 04:01)  T(F): 96.6 (06 Jun 2021 08:00), Max: 98.6 (06 Jun 2021 04:01)  HR: 112 (06 Jun 2021 08:00) (98 - 112)  BP: 156/104 (06 Jun 2021 08:00) (133/90 - 156/104)  BP(mean): 117 (06 Jun 2021 08:00) (117 - 117)  RR: 20 (06 Jun 2021 08:00) (20 - 20)  SpO2: 94% (06 Jun 2021 08:00) (94% - 95%)    CONSTITUTIONAL: NAD,  EYES: PERRLA; conjunctiva and sclera clear  ENMT: Moist oral mucosa,   RESPIRATORY: Normal respiratory effort; lungs are clear to auscultation bilaterally  CARDIOVASCULAR: Regular rate and rhythm, normal S1 and S2, no murmur   EXTS: No lower extremity edema; Peripheral pulses are 2+ bilaterally  ABDOMEN: Nontender to palpation, normoactive bowel sounds, no rebound/guarding;   MUSCLOSKELETAL:  no joint swelling or tenderness to palpation  PSYCH: auditory hallucination, paranoid thought. denies suicidal/homicidal thought. calm  NEUROLOGY: A+O to person, place, and time; +m/s      LABS:                        12.7   8.78  )-----------( 400      ( 06 Jun 2021 06:10 )             37.4     06-06    137  |  101  |  9.3  ----------------------------<  99  4.2   |  27.0  |  0.69    Ca    9.0      06 Jun 2021 06:10    TPro  6.5<L>  /  Alb  3.2<L>  /  TBili  0.6  /  DBili  0.1  /  AST  121<H>  /  ALT  326<H>  /  AlkPhos  80  06-06    PT/INR - ( 06 Jun 2021 06:10 )   PT: 15.7 sec;   INR: 1.37 ratio                     RADIOLOGY & ADDITIONAL TESTS:  Results Reviewed:   Imaging Personally Reviewed:  Electrocardiogram Personally Reviewed:    COORDINATION OF CARE:  Care Discussed with Consultants/Other Providers [Y/N]:  Prior or Outpatient Records Reviewed [Y/N]:

## 2021-06-06 NOTE — PROGRESS NOTE ADULT - ASSESSMENT
34yo M w/pmh HTN presents for SOB x3 weeks associated with SOB worse w/ exertion assoc/w productive cough, chest heaviness, generalized weakness with subjective fevers. Denies n/v, abdominal pain. Reports self-isolating since then. Denies known COVID contacts however 4 days prior to symptoms pt went to a restaurant for dinner and says it was busy. In ED found ot have COVID PNA and Left Lower Lobe PE on CT.      Acute hypoxic respiratory failure 2/2 COVID 19 PNA and LLL PE    - c/w supplemental O2 as needed to maintain O2 sat >90%  - c/w bronchodilators incentive spirometry and frequent proning  - dexamethasone 6mg IV daily and hold remdesivir for transaminitis   - Monitor LFTs . cmp  - Inflammatory markers q48h    -F/u  ID rec  Ambulatory so2.       Provoked acute Left Lower Lobe Pulmonary Embolism  - Likely 2/2 COVID 19  - continue  eliquis started 05/03   - TTE noted.  No evidence of Rt heart strain      Hypokalemia  - Supplemented  - Will monitor electrolytes and supplement as needed  - Goal K~4      HTN  - c/w Lisinopril 5mg qd   - Monitor BP      Sinus tachycardia  - Improving  - Multifactorial 2/2 COVID PNA, PE, anxiety       DM II  - A1c 5.9  - ISS and hypoglycemic protocol on board       Hx of ETOH abuse  - Last drink 3 weeks ago so no risk of withdrawal at this time  - Counseled on abstaining from etoh use      Anxiety/depression  having paranoid thought  - dc  Lexapro per  psych. started  06/04  -informed psych   - f/u  psych rec  -1:1 observation      VTE: eliquis     Care of plan dw pt  moris rn

## 2021-06-07 LAB
ALBUMIN SERPL ELPH-MCNC: 3.5 G/DL — SIGNIFICANT CHANGE UP (ref 3.3–5.2)
ALBUMIN SERPL ELPH-MCNC: 3.6 G/DL — SIGNIFICANT CHANGE UP (ref 3.3–5.2)
ALP SERPL-CCNC: 91 U/L — SIGNIFICANT CHANGE UP (ref 40–120)
ALP SERPL-CCNC: 96 U/L — SIGNIFICANT CHANGE UP (ref 40–120)
ALT FLD-CCNC: 368 U/L — HIGH
ALT FLD-CCNC: 375 U/L — HIGH
ANION GAP SERPL CALC-SCNC: 13 MMOL/L — SIGNIFICANT CHANGE UP (ref 5–17)
AST SERPL-CCNC: 121 U/L — HIGH
AST SERPL-CCNC: 124 U/L — HIGH
BILIRUB DIRECT SERPL-MCNC: <0.1 MG/DL — SIGNIFICANT CHANGE UP (ref 0–0.3)
BILIRUB INDIRECT FLD-MCNC: SIGNIFICANT CHANGE UP MG/DL (ref 0.2–1)
BILIRUB SERPL-MCNC: 0.7 MG/DL — SIGNIFICANT CHANGE UP (ref 0.4–2)
BILIRUB SERPL-MCNC: 0.7 MG/DL — SIGNIFICANT CHANGE UP (ref 0.4–2)
BUN SERPL-MCNC: 8.8 MG/DL — SIGNIFICANT CHANGE UP (ref 8–20)
CALCIUM SERPL-MCNC: 9.2 MG/DL — SIGNIFICANT CHANGE UP (ref 8.6–10.2)
CHLORIDE SERPL-SCNC: 98 MMOL/L — SIGNIFICANT CHANGE UP (ref 98–107)
CO2 SERPL-SCNC: 22 MMOL/L — SIGNIFICANT CHANGE UP (ref 22–29)
CREAT SERPL-MCNC: 0.85 MG/DL — SIGNIFICANT CHANGE UP (ref 0.5–1.3)
D DIMER BLD IA.RAPID-MCNC: 364 NG/ML DDU — HIGH
FERRITIN SERPL-MCNC: 960 NG/ML — HIGH (ref 30–400)
GLUCOSE BLDC GLUCOMTR-MCNC: 107 MG/DL — HIGH (ref 70–99)
GLUCOSE BLDC GLUCOMTR-MCNC: 110 MG/DL — HIGH (ref 70–99)
GLUCOSE BLDC GLUCOMTR-MCNC: 99 MG/DL — SIGNIFICANT CHANGE UP (ref 70–99)
GLUCOSE BLDC GLUCOMTR-MCNC: 99 MG/DL — SIGNIFICANT CHANGE UP (ref 70–99)
GLUCOSE SERPL-MCNC: 108 MG/DL — HIGH (ref 70–99)
HCT VFR BLD CALC: 40.5 % — SIGNIFICANT CHANGE UP (ref 39–50)
HGB BLD-MCNC: 14 G/DL — SIGNIFICANT CHANGE UP (ref 13–17)
INR BLD: 1.52 RATIO — HIGH (ref 0.88–1.16)
LDH SERPL L TO P-CCNC: 318 U/L — HIGH (ref 98–192)
MCHC RBC-ENTMCNC: 31 PG — SIGNIFICANT CHANGE UP (ref 27–34)
MCHC RBC-ENTMCNC: 34.6 GM/DL — SIGNIFICANT CHANGE UP (ref 32–36)
MCV RBC AUTO: 89.8 FL — SIGNIFICANT CHANGE UP (ref 80–100)
PLATELET # BLD AUTO: 429 K/UL — HIGH (ref 150–400)
POTASSIUM SERPL-MCNC: 4.1 MMOL/L — SIGNIFICANT CHANGE UP (ref 3.5–5.3)
POTASSIUM SERPL-SCNC: 4.1 MMOL/L — SIGNIFICANT CHANGE UP (ref 3.5–5.3)
PROT SERPL-MCNC: 7 G/DL — SIGNIFICANT CHANGE UP (ref 6.6–8.7)
PROT SERPL-MCNC: 7.3 G/DL — SIGNIFICANT CHANGE UP (ref 6.6–8.7)
PROTHROM AB SERPL-ACNC: 17.3 SEC — HIGH (ref 10.6–13.6)
RBC # BLD: 4.51 M/UL — SIGNIFICANT CHANGE UP (ref 4.2–5.8)
RBC # FLD: 12.6 % — SIGNIFICANT CHANGE UP (ref 10.3–14.5)
SODIUM SERPL-SCNC: 133 MMOL/L — LOW (ref 135–145)
WBC # BLD: 11.11 K/UL — HIGH (ref 3.8–10.5)
WBC # FLD AUTO: 11.11 K/UL — HIGH (ref 3.8–10.5)

## 2021-06-07 PROCEDURE — 99232 SBSQ HOSP IP/OBS MODERATE 35: CPT

## 2021-06-07 RX ADMIN — LISINOPRIL 5 MILLIGRAM(S): 2.5 TABLET ORAL at 06:14

## 2021-06-07 RX ADMIN — Medication 6 MILLIGRAM(S): at 05:24

## 2021-06-07 RX ADMIN — APIXABAN 10 MILLIGRAM(S): 2.5 TABLET, FILM COATED ORAL at 17:52

## 2021-06-07 RX ADMIN — Medication 2 MILLIGRAM(S): at 04:25

## 2021-06-07 RX ADMIN — APIXABAN 10 MILLIGRAM(S): 2.5 TABLET, FILM COATED ORAL at 06:14

## 2021-06-07 RX ADMIN — Medication 200 MILLIGRAM(S): at 05:24

## 2021-06-07 RX ADMIN — INSULIN GLARGINE 5 UNIT(S): 100 INJECTION, SOLUTION SUBCUTANEOUS at 22:32

## 2021-06-07 RX ADMIN — Medication 5 MILLIGRAM(S): at 22:32

## 2021-06-07 RX ADMIN — Medication 200 MILLIGRAM(S): at 22:32

## 2021-06-07 NOTE — PROVIDER CONTACT NOTE (CHANGE IN STATUS NOTIFICATION) - BACKGROUND
36yo M w/pmh HTN presents for SOB x3 weeks associated with SOB worse w/ exertion assoc/w productive cough, chest heaviness, generalized weakness with subjective fevers. Denies n/v, abdominal pain. Reports self-isolating since then. Denies known COVID contacts however 4 days prior to symptoms pt went to a restaurant for dinner and says it was busy. In ED found ot have COVID PNA and Left Lower Lobe PE on CT.

## 2021-06-07 NOTE — PROGRESS NOTE ADULT - ASSESSMENT
36yo M w/pmh HTN presents for SOB x3 weeks associated with SOB worse w/ exertion assoc/w productive cough, chest heaviness, generalized weakness with subjective fevers. Denies n/v, abdominal pain. Reports self-isolating since then. Denies known COVID contacts however 4 days prior to symptoms pt went to a restaurant for dinner and says it was busy. In ED found ot have COVID PNA and Left Lower Lobe PE on CT.      Acute hypoxic respiratory failure 2/2 COVID 19 PNA and LLL PE    - c/w supplemental O2 as needed to maintain O2 sat >90%  - c/w bronchodilators incentive spirometry and frequent proning  - dexamethasone 6mg IV daily and hold remdesivir for transaminitis   - Monitor LFTs . cmp  - Inflammatory markers q48h    -F/u  ID rec  Ambulatory so2.       Provoked acute Left Lower Lobe Pulmonary Embolism  - Likely 2/2 COVID 19  - continue  eliquis started 05/03   - TTE noted.  No evidence of Rt heart strain      Hypokalemia  - Supplemented  - Will monitor electrolytes and supplement as needed  - Goal K~4      HTN  - c/w Lisinopril 5mg qd   - Monitor BP      Sinus tachycardia  - Improving  - Multifactorial 2/2 COVID PNA, PE, anxiety       DM II  - A1c 5.9  - ISS and hypoglycemic protocol on board       Hx of ETOH abuse  - Last drink 3 weeks ago so no risk of withdrawal at this time  - Counseled on abstaining from etoh use      Anxiety/depression  having paranoid thought  - dc  Lexapro per  psych. started  06/04  -informed psych again today for eval  - f/u  psych rec  -1:1 observation      VTE: eliquis     Care of plan dw pt  moris rn

## 2021-06-07 NOTE — CHART NOTE - NSCHARTNOTEFT_GEN_A_CORE
Called by Nursing supervisor to see this patient whom called police @ 911 twice because he thinks the Nurses on unit ( Three of them) are trying to kill him.   I found patient sitting up in bed appearing anxious with cell phone Called by Nursing supervisor to see this patient whom called police @ 911 twice because he thinks the Nurses on unit ( Three of them) are trying to kill him.   I found patient sitting up in bed appearing anxious with cell phone in hand writing notes regarding the people he feels is trying krys kill him.   I introduced myself to the patient and sat down to try and make him feels calm. He was very receptive to my presence and even tho his thoughts and ideas were not rational he nonetheless was cooperative with talking with me. He is anxious but not combative at this time. He just keeps repeating over and over again that there are three Nurses out to kill him and he wants them arrested before they can pull it off.  Clinician notes and lab results reviewed.    Vital Signs Last 24 Hrs  T(C): 36.8 (06 Jun 2021 23:00), Max: 37.2 (06 Jun 2021 20:00)  T(F): 98.3 (06 Jun 2021 23:00), Max: 99 (06 Jun 2021 20:00)  HR: 96 (06 Jun 2021 23:00) (96 - 112)  BP: 137/97 (07 Jun 2021 00:30) (105/81 - 156/104)  BP(mean): 107 (07 Jun 2021 00:30) (84 - 117)  RR: 18 (07 Jun 2021 00:30) (18 - 24)  SpO2: 95% (07 Jun 2021 03:00) (93% - 97%)                          12.7   8.78  )-----------( 400      ( 06 Jun 2021 06:10 )             37.4     06-06    137  |  101  |  9.3  ----------------------------<  99  4.2   |  27.0  |  0.69    Ca    9.0      06 Jun 2021 06:10    TPro  6.5<L>  /  Alb  3.2<L>  /  TBili  0.6  /  DBili  0.1  /  AST  121<H>  /  ALT  326<H>  /  AlkPhos  80  06-06      PHYSICAL EXAM:    Patient stated he doesn't need a physical exam because he is fine physically; He just wants the three Nurses who are trying to kill him to be arrested right away.  However, Pt agreed to take Ativan 2mg IVP to help calm him down.  Ativan 2mg given IVP X one dose.  Patient was calm and almost falling asleep as I left. I spent 30 minutes with patient.  RN to call PA PRN for any acute changes with pt.

## 2021-06-07 NOTE — PROGRESS NOTE ADULT - SUBJECTIVE AND OBJECTIVE BOX
Patient is a 35y old  Male who presents with a chief complaint of SOB and fevers COVID PNA and PE (06 Jun 2021 09:45)    pt is calm now but has paranoid thought. so2 94% 2L NC at rest. Pt denies suicidal/homicidal idea. sob with exertion/when gets anxious.      REVIEW OF SYSTEMS: All systems are reviewed and found to be negative except above    MEDICATIONS  (STANDING):  ALBUTerol    90 MICROgram(s) HFA Inhaler 2 Puff(s) Inhalation every 6 hours  apixaban 10 milliGRAM(s) Oral every 12 hours  dexAMETHasone  Injectable 6 milliGRAM(s) IV Push daily  dextrose 40% Gel 15 Gram(s) Oral once  dextrose 5%. 1000 milliLiter(s) (50 mL/Hr) IV Continuous <Continuous>  dextrose 5%. 1000 milliLiter(s) (100 mL/Hr) IV Continuous <Continuous>  dextrose 50% Injectable 25 Gram(s) IV Push once  dextrose 50% Injectable 12.5 Gram(s) IV Push once  dextrose 50% Injectable 25 Gram(s) IV Push once  glucagon  Injectable 1 milliGRAM(s) IntraMuscular once  insulin glargine Injectable (LANTUS) 5 Unit(s) SubCutaneous at bedtime  insulin lispro (ADMELOG) corrective regimen sliding scale   SubCutaneous three times a day before meals  insulin lispro Injectable (ADMELOG) 1 Unit(s) SubCutaneous three times a day before meals  lisinopril 5 milliGRAM(s) Oral daily  melatonin 5 milliGRAM(s) Oral at bedtime    MEDICATIONS  (PRN):  acetaminophen   Tablet .. 650 milliGRAM(s) Oral every 4 hours PRN Temp greater or equal to 38.5C (101.3F)  acetaminophen  Suppository .. 650 milliGRAM(s) Rectal every 4 hours PRN Temp greater or equal to 38.5C (101.3F)  benzonatate 100 milliGRAM(s) Oral every 8 hours PRN Cough  guaiFENesin Oral Liquid (Sugar-Free) 200 milliGRAM(s) Oral every 6 hours PRN Cough  LORazepam     Tablet 0.5 milliGRAM(s) Oral two times a day PRN Anxiety      CAPILLARY BLOOD GLUCOSE      POCT Blood Glucose.: 107 mg/dL (07 Jun 2021 08:26)  POCT Blood Glucose.: 111 mg/dL (06 Jun 2021 21:36)  POCT Blood Glucose.: 121 mg/dL (06 Jun 2021 13:01)    I&O's Summary    06 Jun 2021 07:01  -  07 Jun 2021 07:00  --------------------------------------------------------  IN: 980 mL / OUT: 2375 mL / NET: -1395 mL        PHYSICAL EXAM:  Vital Signs Last 24 Hrs  T(C): 36.4 (07 Jun 2021 07:17), Max: 37.2 (06 Jun 2021 20:00)  T(F): 97.5 (07 Jun 2021 07:17), Max: 99 (06 Jun 2021 20:00)  HR: 101 (07 Jun 2021 07:17) (96 - 112)  BP: 131/90 (07 Jun 2021 07:17) (105/81 - 173/118)  BP(mean): 131 (07 Jun 2021 03:45) (84 - 131)  RR: 18 (07 Jun 2021 07:17) (18 - 31)  SpO2: 96% (07 Jun 2021 07:17) (92% - 97%)    CONSTITUTIONAL: NAD,  EYES: PERRLA; conjunctiva and sclera clear  ENMT: Moist oral mucosa,   RESPIRATORY: Normal respiratory effort; lungs are clear to auscultation bilaterally  CARDIOVASCULAR: Regular rate and rhythm, normal S1 and S2, no murmur   EXTS: No lower extremity edema; Peripheral pulses are 2+ bilaterally  ABDOMEN: Nontender to palpation, normoactive bowel sounds, no rebound/guarding;   MUSCLOSKELETAL:  no joint swelling or tenderness to palpation  PSYCH: calm now. paranoid thought  NEUROLOGY: A+O to person, place, and time; ; no gross  deficits;       LABS:                        14.0   11.11 )-----------( 429      ( 07 Jun 2021 04:56 )             40.5     06-07    133<L>  |  98  |  8.8  ----------------------------<  108<H>  4.1   |  22.0  |  0.85    Ca    9.2      07 Jun 2021 04:56    TPro  7.0  /  Alb  3.5  /  TBili  0.7  /  DBili  <0.1  /  AST  124<H>  /  ALT  375<H>  /  AlkPhos  91  06-07    PT/INR - ( 06 Jun 2021 06:10 )   PT: 15.7 sec;   INR: 1.37 ratio                     RADIOLOGY & ADDITIONAL TESTS:  Results Reviewed:   Imaging Personally Reviewed:  Electrocardiogram Personally Reviewed:    COORDINATION OF CARE:  Care Discussed with Consultants/Other Providers [Y/N]:  Prior or Outpatient Records Reviewed [Y/N]:

## 2021-06-07 NOTE — PROVIDER CONTACT NOTE (CHANGE IN STATUS NOTIFICATION) - ACTION/TREATMENT ORDERED:
Nursing supervisor Jose HUBER came to bedside to evaluate and speak to patient. Ativan 2mg IVP given. Maintain constant observation.

## 2021-06-07 NOTE — PROVIDER CONTACT NOTE (CHANGE IN STATUS NOTIFICATION) - SITUATION
PCA Monika came running out of room stated patient jumped out of bed, removed electrodes and pulse ox. Upon entering patients' room notice patient next to his bed, very anxious stating she's trying to kill me. Patient kept saying she conspired with nurse Salas to kill him. "I want the police to arrest her". Patient stated that he will call the police. I notified patient not to call the police but instead I will have the nursing supervisor speak to him but patient insisted that he will call the police. Nursing supervisor Grace made aware and she came to bedside to speak to patient. Patient paranoid and hallucinating. CECILE Miramontes also notified of above.

## 2021-06-08 LAB
ALBUMIN SERPL ELPH-MCNC: 3.6 G/DL — SIGNIFICANT CHANGE UP (ref 3.3–5.2)
ALBUMIN SERPL ELPH-MCNC: 3.6 G/DL — SIGNIFICANT CHANGE UP (ref 3.3–5.2)
ALP SERPL-CCNC: 89 U/L — SIGNIFICANT CHANGE UP (ref 40–120)
ALP SERPL-CCNC: 90 U/L — SIGNIFICANT CHANGE UP (ref 40–120)
ALT FLD-CCNC: 332 U/L — HIGH
ALT FLD-CCNC: 341 U/L — HIGH
ANION GAP SERPL CALC-SCNC: 14 MMOL/L — SIGNIFICANT CHANGE UP (ref 5–17)
AST SERPL-CCNC: 105 U/L — HIGH
AST SERPL-CCNC: 106 U/L — HIGH
BILIRUB DIRECT SERPL-MCNC: 0.2 MG/DL — SIGNIFICANT CHANGE UP (ref 0–0.3)
BILIRUB INDIRECT FLD-MCNC: 0.4 MG/DL — SIGNIFICANT CHANGE UP (ref 0.2–1)
BILIRUB SERPL-MCNC: 0.6 MG/DL — SIGNIFICANT CHANGE UP (ref 0.4–2)
BILIRUB SERPL-MCNC: 0.6 MG/DL — SIGNIFICANT CHANGE UP (ref 0.4–2)
BUN SERPL-MCNC: 11.9 MG/DL — SIGNIFICANT CHANGE UP (ref 8–20)
CALCIUM SERPL-MCNC: 9 MG/DL — SIGNIFICANT CHANGE UP (ref 8.6–10.2)
CHLORIDE SERPL-SCNC: 99 MMOL/L — SIGNIFICANT CHANGE UP (ref 98–107)
CO2 SERPL-SCNC: 22 MMOL/L — SIGNIFICANT CHANGE UP (ref 22–29)
CREAT SERPL-MCNC: 0.71 MG/DL — SIGNIFICANT CHANGE UP (ref 0.5–1.3)
GLUCOSE BLDC GLUCOMTR-MCNC: 100 MG/DL — HIGH (ref 70–99)
GLUCOSE BLDC GLUCOMTR-MCNC: 103 MG/DL — HIGH (ref 70–99)
GLUCOSE BLDC GLUCOMTR-MCNC: 113 MG/DL — HIGH (ref 70–99)
GLUCOSE BLDC GLUCOMTR-MCNC: 118 MG/DL — HIGH (ref 70–99)
GLUCOSE SERPL-MCNC: 101 MG/DL — HIGH (ref 70–99)
HCT VFR BLD CALC: 40.5 % — SIGNIFICANT CHANGE UP (ref 39–50)
HGB BLD-MCNC: 13.9 G/DL — SIGNIFICANT CHANGE UP (ref 13–17)
MCHC RBC-ENTMCNC: 30.8 PG — SIGNIFICANT CHANGE UP (ref 27–34)
MCHC RBC-ENTMCNC: 34.3 GM/DL — SIGNIFICANT CHANGE UP (ref 32–36)
MCV RBC AUTO: 89.6 FL — SIGNIFICANT CHANGE UP (ref 80–100)
PLATELET # BLD AUTO: 384 K/UL — SIGNIFICANT CHANGE UP (ref 150–400)
POTASSIUM SERPL-MCNC: 4.2 MMOL/L — SIGNIFICANT CHANGE UP (ref 3.5–5.3)
POTASSIUM SERPL-SCNC: 4.2 MMOL/L — SIGNIFICANT CHANGE UP (ref 3.5–5.3)
PROT SERPL-MCNC: 7 G/DL — SIGNIFICANT CHANGE UP (ref 6.6–8.7)
PROT SERPL-MCNC: 7.2 G/DL — SIGNIFICANT CHANGE UP (ref 6.6–8.7)
RBC # BLD: 4.52 M/UL — SIGNIFICANT CHANGE UP (ref 4.2–5.8)
RBC # FLD: 12.6 % — SIGNIFICANT CHANGE UP (ref 10.3–14.5)
SODIUM SERPL-SCNC: 134 MMOL/L — LOW (ref 135–145)
WBC # BLD: 7.37 K/UL — SIGNIFICANT CHANGE UP (ref 3.8–10.5)
WBC # FLD AUTO: 7.37 K/UL — SIGNIFICANT CHANGE UP (ref 3.8–10.5)

## 2021-06-08 PROCEDURE — 99233 SBSQ HOSP IP/OBS HIGH 50: CPT

## 2021-06-08 PROCEDURE — 99232 SBSQ HOSP IP/OBS MODERATE 35: CPT

## 2021-06-08 RX ORDER — RISPERIDONE 4 MG/1
1 TABLET ORAL AT BEDTIME
Refills: 0 | Status: DISCONTINUED | OUTPATIENT
Start: 2021-06-08 | End: 2021-06-10

## 2021-06-08 RX ADMIN — APIXABAN 10 MILLIGRAM(S): 2.5 TABLET, FILM COATED ORAL at 05:44

## 2021-06-08 RX ADMIN — RISPERIDONE 1 MILLIGRAM(S): 4 TABLET ORAL at 21:17

## 2021-06-08 RX ADMIN — INSULIN GLARGINE 5 UNIT(S): 100 INJECTION, SOLUTION SUBCUTANEOUS at 21:20

## 2021-06-08 RX ADMIN — Medication 100 MILLIGRAM(S): at 23:08

## 2021-06-08 RX ADMIN — LISINOPRIL 5 MILLIGRAM(S): 2.5 TABLET ORAL at 05:43

## 2021-06-08 RX ADMIN — Medication 200 MILLIGRAM(S): at 23:08

## 2021-06-08 RX ADMIN — Medication 5 MILLIGRAM(S): at 21:17

## 2021-06-08 RX ADMIN — Medication 6 MILLIGRAM(S): at 05:45

## 2021-06-08 RX ADMIN — APIXABAN 10 MILLIGRAM(S): 2.5 TABLET, FILM COATED ORAL at 17:01

## 2021-06-08 RX ADMIN — Medication 0.5 MILLIGRAM(S): at 21:17

## 2021-06-08 NOTE — PROGRESS NOTE ADULT - SUBJECTIVE AND OBJECTIVE BOX
Patient is a 35y old  Male who presents with a chief complaint of SOB and fevers COVID PNA and PE (07 Jun 2021 10:23)      Pt seen and exam. Breathing improving. so2 stable RA rest. Denies any hallucination today,pt is calm but still paranoid.  denies cp,sob at best  No fever,chill      REVIEW OF SYSTEMS: All systems are reviewed and found to be negative except above    MEDICATIONS  (STANDING):  ALBUTerol    90 MICROgram(s) HFA Inhaler 2 Puff(s) Inhalation every 6 hours  apixaban 10 milliGRAM(s) Oral every 12 hours  dexAMETHasone  Injectable 6 milliGRAM(s) IV Push daily  dextrose 40% Gel 15 Gram(s) Oral once  dextrose 5%. 1000 milliLiter(s) (50 mL/Hr) IV Continuous <Continuous>  dextrose 5%. 1000 milliLiter(s) (100 mL/Hr) IV Continuous <Continuous>  dextrose 50% Injectable 25 Gram(s) IV Push once  dextrose 50% Injectable 12.5 Gram(s) IV Push once  dextrose 50% Injectable 25 Gram(s) IV Push once  glucagon  Injectable 1 milliGRAM(s) IntraMuscular once  insulin glargine Injectable (LANTUS) 5 Unit(s) SubCutaneous at bedtime  insulin lispro (ADMELOG) corrective regimen sliding scale   SubCutaneous three times a day before meals  insulin lispro Injectable (ADMELOG) 1 Unit(s) SubCutaneous three times a day before meals  lisinopril 5 milliGRAM(s) Oral daily  melatonin 5 milliGRAM(s) Oral at bedtime    MEDICATIONS  (PRN):  acetaminophen   Tablet .. 650 milliGRAM(s) Oral every 4 hours PRN Temp greater or equal to 38.5C (101.3F)  acetaminophen  Suppository .. 650 milliGRAM(s) Rectal every 4 hours PRN Temp greater or equal to 38.5C (101.3F)  benzonatate 100 milliGRAM(s) Oral every 8 hours PRN Cough  guaiFENesin Oral Liquid (Sugar-Free) 200 milliGRAM(s) Oral every 6 hours PRN Cough  LORazepam     Tablet 0.5 milliGRAM(s) Oral two times a day PRN Anxiety      CAPILLARY BLOOD GLUCOSE      POCT Blood Glucose.: 118 mg/dL (08 Jun 2021 09:27)  POCT Blood Glucose.: 99 mg/dL (07 Jun 2021 22:24)  POCT Blood Glucose.: 99 mg/dL (07 Jun 2021 17:19)  POCT Blood Glucose.: 110 mg/dL (07 Jun 2021 12:55)    I&O's Summary    07 Jun 2021 07:01  -  08 Jun 2021 07:00  --------------------------------------------------------  IN: 600 mL / OUT: 975 mL / NET: -375 mL    08 Jun 2021 07:01  -  08 Jun 2021 11:27  --------------------------------------------------------  IN: 0 mL / OUT: 250 mL / NET: -250 mL        PHYSICAL EXAM:  Vital Signs Last 24 Hrs  T(C): 36.8 (08 Jun 2021 10:43), Max: 37 (08 Jun 2021 04:15)  T(F): 98.2 (08 Jun 2021 10:43), Max: 98.6 (08 Jun 2021 04:15)  HR: 109 (08 Jun 2021 10:43) (88 - 109)  BP: 132/74 (08 Jun 2021 10:43) (132/74 - 147/93)  BP(mean): 106 (08 Jun 2021 04:15) (106 - 106)  RR: 18 (08 Jun 2021 10:43) (17 - 19)  SpO2: 97% (08 Jun 2021 10:43) (97% - 97%)    CONSTITUTIONAL: NAD,  EYES: PERRLA; conjunctiva and sclera clear  ENMT: Moist oral mucosa,   RESPIRATORY: Normal respiratory effort; lungs are clear to auscultation bilaterally  CARDIOVASCULAR: Regular rate and rhythm, normal S1 and S2, no murmur   EXTS: No lower extremity edema; Peripheral pulses are 2+ bilaterally  ABDOMEN: Nontender to palpation, normoactive bowel sounds, no rebound/guarding;   MUSCLOSKELETAL: no joint swelling or tenderness to palpation  PSYCH: calm. no hallucination present now. +paranoid thought  NEUROLOGY: A+O to person, place, and time; no focal neuro deficit      LABS:                        13.9   7.37  )-----------( 384      ( 08 Jun 2021 07:14 )             40.5     06-08    134<L>  |  99  |  11.9  ----------------------------<  101<H>  4.2   |  22.0  |  0.71    Ca    9.0      08 Jun 2021 07:14    TPro  7.2  /  Alb  3.6  /  TBili  0.6  /  DBili  0.2  /  AST  105<H>  /  ALT  332<H>  /  AlkPhos  89  06-08    PT/INR - ( 07 Jun 2021 11:25 )   PT: 17.3 sec;   INR: 1.52 ratio                     RADIOLOGY & ADDITIONAL TESTS:  Results Reviewed:   Imaging Personally Reviewed:  Electrocardiogram Personally Reviewed:    COORDINATION OF CARE:  Care Discussed with Consultants/Other Providers [Y/N]:  Prior or Outpatient Records Reviewed [Y/N]:

## 2021-06-08 NOTE — PROGRESS NOTE ADULT - ASSESSMENT
36yo M w/pmh HTN presents for SOB x3 weeks associated with SOB worse w/ exertion assoc/w productive cough, chest heaviness, generalized weakness with subjective fevers. Denies n/v, abdominal pain. Reports self-isolating since then. Denies known COVID contacts however 4 days prior to symptoms pt went to a restaurant for dinner and says it was busy. In ED found ot have COVID PNA and Left Lower Lobe PE on CT.      Acute hypoxic respiratory failure 2/2 COVID 19 PNA and LLL PE    - c/w supplemental O2 as needed to maintain O2 sat >90%  - c/w bronchodilators incentive spirometry and frequent proning  - dexamethasone 6mg IV daily and hold remdesivir for transaminitis   - Monitor LFTs . cmp  - Inflammatory markers q48h    -F/u  ID rec  - spoke with RN to check Ambulatory so2.       Provoked acute Left Lower Lobe Pulmonary Embolism  - Likely 2/2 COVID 19  - continue  eliquis started 05/03   - TTE noted.  No evidence of Rt heart strain      Hypokalemia  - Supplemented  - Will monitor electrolytes and supplement as needed  - Goal K~4      HTN  - c/w Lisinopril 5mg qd   - Monitor BP      Sinus tachycardia  - Improving  - Multifactorial 2/2 COVID PNA, PE, anxiety       DM II  - A1c 5.9  - ISS and hypoglycemic protocol on board       Hx of ETOH abuse  - Last drink 3 weeks ago so no risk of withdrawal at this time  - Counseled on abstaining from etoh use      Anxiety/depression  having paranoid thought  -spoke with psych today. will f/u rec  - dc  Lexapro per  psych. started  06/04  -informed psych again today for eval  - f/u  psych rec  -1:1 observation      VTE: eliquis     Care of plan dw pt  dw rn  dw sw

## 2021-06-09 LAB
ALBUMIN SERPL ELPH-MCNC: 3.6 G/DL — SIGNIFICANT CHANGE UP (ref 3.3–5.2)
ALP SERPL-CCNC: 91 U/L — SIGNIFICANT CHANGE UP (ref 40–120)
ALT FLD-CCNC: 351 U/L — HIGH
ANION GAP SERPL CALC-SCNC: 11 MMOL/L — SIGNIFICANT CHANGE UP (ref 5–17)
AST SERPL-CCNC: 110 U/L — HIGH
BILIRUB DIRECT SERPL-MCNC: 0.2 MG/DL — SIGNIFICANT CHANGE UP (ref 0–0.3)
BILIRUB INDIRECT FLD-MCNC: 0.6 MG/DL — SIGNIFICANT CHANGE UP (ref 0.2–1)
BILIRUB SERPL-MCNC: 0.7 MG/DL — SIGNIFICANT CHANGE UP (ref 0.4–2)
BUN SERPL-MCNC: 12.6 MG/DL — SIGNIFICANT CHANGE UP (ref 8–20)
CALCIUM SERPL-MCNC: 9.5 MG/DL — SIGNIFICANT CHANGE UP (ref 8.6–10.2)
CHLORIDE SERPL-SCNC: 97 MMOL/L — LOW (ref 98–107)
CO2 SERPL-SCNC: 25 MMOL/L — SIGNIFICANT CHANGE UP (ref 22–29)
CREAT SERPL-MCNC: 0.77 MG/DL — SIGNIFICANT CHANGE UP (ref 0.5–1.3)
GLUCOSE BLDC GLUCOMTR-MCNC: 105 MG/DL — HIGH (ref 70–99)
GLUCOSE BLDC GLUCOMTR-MCNC: 110 MG/DL — HIGH (ref 70–99)
GLUCOSE BLDC GLUCOMTR-MCNC: 116 MG/DL — HIGH (ref 70–99)
GLUCOSE BLDC GLUCOMTR-MCNC: 138 MG/DL — HIGH (ref 70–99)
GLUCOSE SERPL-MCNC: 103 MG/DL — HIGH (ref 70–99)
HCT VFR BLD CALC: 42.4 % — SIGNIFICANT CHANGE UP (ref 39–50)
HGB BLD-MCNC: 14.2 G/DL — SIGNIFICANT CHANGE UP (ref 13–17)
INR BLD: 1.27 RATIO — HIGH (ref 0.88–1.16)
MCHC RBC-ENTMCNC: 30.8 PG — SIGNIFICANT CHANGE UP (ref 27–34)
MCHC RBC-ENTMCNC: 33.5 GM/DL — SIGNIFICANT CHANGE UP (ref 32–36)
MCV RBC AUTO: 92 FL — SIGNIFICANT CHANGE UP (ref 80–100)
PLATELET # BLD AUTO: 415 K/UL — HIGH (ref 150–400)
POTASSIUM SERPL-MCNC: 4.6 MMOL/L — SIGNIFICANT CHANGE UP (ref 3.5–5.3)
POTASSIUM SERPL-SCNC: 4.6 MMOL/L — SIGNIFICANT CHANGE UP (ref 3.5–5.3)
PROT SERPL-MCNC: 7.3 G/DL — SIGNIFICANT CHANGE UP (ref 6.6–8.7)
PROTHROM AB SERPL-ACNC: 14.6 SEC — HIGH (ref 10.6–13.6)
RBC # BLD: 4.61 M/UL — SIGNIFICANT CHANGE UP (ref 4.2–5.8)
RBC # FLD: 12.7 % — SIGNIFICANT CHANGE UP (ref 10.3–14.5)
SODIUM SERPL-SCNC: 133 MMOL/L — LOW (ref 135–145)
WBC # BLD: 8.31 K/UL — SIGNIFICANT CHANGE UP (ref 3.8–10.5)
WBC # FLD AUTO: 8.31 K/UL — SIGNIFICANT CHANGE UP (ref 3.8–10.5)

## 2021-06-09 PROCEDURE — 99233 SBSQ HOSP IP/OBS HIGH 50: CPT

## 2021-06-09 PROCEDURE — 99232 SBSQ HOSP IP/OBS MODERATE 35: CPT

## 2021-06-09 RX ADMIN — INSULIN GLARGINE 5 UNIT(S): 100 INJECTION, SOLUTION SUBCUTANEOUS at 21:27

## 2021-06-09 RX ADMIN — RISPERIDONE 1 MILLIGRAM(S): 4 TABLET ORAL at 21:26

## 2021-06-09 RX ADMIN — LISINOPRIL 5 MILLIGRAM(S): 2.5 TABLET ORAL at 05:51

## 2021-06-09 RX ADMIN — APIXABAN 10 MILLIGRAM(S): 2.5 TABLET, FILM COATED ORAL at 05:51

## 2021-06-09 RX ADMIN — Medication 1 UNIT(S): at 05:52

## 2021-06-09 RX ADMIN — Medication 200 MILLIGRAM(S): at 05:50

## 2021-06-09 RX ADMIN — ALBUTEROL 2 PUFF(S): 90 AEROSOL, METERED ORAL at 21:29

## 2021-06-09 RX ADMIN — Medication 6 MILLIGRAM(S): at 05:51

## 2021-06-09 RX ADMIN — APIXABAN 10 MILLIGRAM(S): 2.5 TABLET, FILM COATED ORAL at 17:09

## 2021-06-09 RX ADMIN — Medication 5 MILLIGRAM(S): at 21:26

## 2021-06-09 NOTE — DIETITIAN INITIAL EVALUATION ADULT. - PERTINENT MEDS FT
MEDICATIONS  (STANDING):  ALBUTerol    90 MICROgram(s) HFA Inhaler 2 Puff(s) Inhalation every 6 hours  apixaban 10 milliGRAM(s) Oral every 12 hours  dexAMETHasone  Injectable 6 milliGRAM(s) IV Push daily  dextrose 40% Gel 15 Gram(s) Oral once  dextrose 5%. 1000 milliLiter(s) (50 mL/Hr) IV Continuous <Continuous>  dextrose 5%. 1000 milliLiter(s) (100 mL/Hr) IV Continuous <Continuous>  dextrose 50% Injectable 25 Gram(s) IV Push once  dextrose 50% Injectable 12.5 Gram(s) IV Push once  dextrose 50% Injectable 25 Gram(s) IV Push once  glucagon  Injectable 1 milliGRAM(s) IntraMuscular once  insulin glargine Injectable (LANTUS) 5 Unit(s) SubCutaneous at bedtime  insulin lispro (ADMELOG) corrective regimen sliding scale   SubCutaneous three times a day before meals  insulin lispro Injectable (ADMELOG) 1 Unit(s) SubCutaneous three times a day before meals  lisinopril 5 milliGRAM(s) Oral daily  melatonin 5 milliGRAM(s) Oral at bedtime  risperiDONE   Tablet 1 milliGRAM(s) Oral at bedtime    MEDICATIONS  (PRN):  acetaminophen   Tablet .. 650 milliGRAM(s) Oral every 4 hours PRN Temp greater or equal to 38.5C (101.3F)  acetaminophen  Suppository .. 650 milliGRAM(s) Rectal every 4 hours PRN Temp greater or equal to 38.5C (101.3F)  benzonatate 100 milliGRAM(s) Oral every 8 hours PRN Cough  guaiFENesin Oral Liquid (Sugar-Free) 200 milliGRAM(s) Oral every 6 hours PRN Cough  LORazepam     Tablet 0.5 milliGRAM(s) Oral two times a day PRN Anxiety

## 2021-06-09 NOTE — BH CONSULTATION LIAISON PROGRESS NOTE - NSBHCHARTREVIEWINVESTIGATE_PSY_A_CORE FT
Ventricular Rate 113 BPM    Atrial Rate 113 BPM    P-R Interval 154 ms    QRS Duration 98 ms    Q-T Interval 354 ms    QTC Calculation(Bazett) 485 ms    P Axis 32 degrees    R Axis 8 degrees    T Axis 36 degrees    Diagnosis Line Sinus tachycardia  Otherwise normal ECG    Confirmed by Kulwinder Grey ([id number]) on 6/1/2021 11:02:07 AM  
Ventricular Rate 113 BPM    Atrial Rate 113 BPM    P-R Interval 154 ms    QRS Duration 98 ms    Q-T Interval 354 ms    QTC Calculation(Bazett) 485 ms    P Axis 32 degrees    R Axis 8 degrees    T Axis 36 degrees    Diagnosis Line Sinus tachycardia  Otherwise normal ECG    Confirmed by Kulwinder Grey ([id number]) on 6/1/2021 11:02:07 AM

## 2021-06-09 NOTE — PROGRESS NOTE ADULT - ASSESSMENT
36yo M w/pmh HTN presents for SOB x3 weeks associated with SOB worse w/ exertion assoc/w productive cough, chest heaviness, generalized weakness with subjective fevers. Denies n/v, abdominal pain. Reports self-isolating since then. Denies known COVID contacts however 4 days prior to symptoms pt went to a restaurant for dinner and says it was busy. In ED found ot have COVID PNA and Left Lower Lobe PE on CT.      Acute hypoxic respiratory failure 2/2 COVID 19 PNA and LLL PE    - c/w supplemental O2 as needed to maintain O2 sat >90%  - c/w bronchodilators incentive spirometry and frequent proning  - dexamethasone 6mg IV daily and hold remdesivir for transaminitis. will dc steroid   - Monitor LFTs . cmp   -F/u  ID rec  - spoke with RN to check Ambulatory so2.       Provoked acute Left Lower Lobe Pulmonary Embolism  - Likely 2/2 COVID 19  - continue  eliquis started 05/03   - TTE noted.  No evidence of Rt heart strain      Hypokalemia  - Supplemented  - Will monitor electrolytes and supplement as needed  - Goal K~4      HTN  - c/w Lisinopril 5mg qd   - Monitor BP      Sinus tachycardia  - Improving  - Multifactorial 2/2 COVID PNA, PE, anxiety       DM II  - A1c 5.9  - ISS and hypoglycemic protocol on board       Hx of ETOH abuse  - Last drink 3 weeks ago so no risk of withdrawal at this time  - Counseled on abstaining from etoh use      Anxiety/depression, paranoid delusion  -spoke with psych,add risperidone, may need in pt psych if does not improve per Dr Roth  - jennifer  Lexapro   - f/u  psych rec  -1:1 observation      VTE: eliquis     Care of plan dw pt  moris rn  moris sw

## 2021-06-09 NOTE — BH CONSULTATION LIAISON PROGRESS NOTE - NSBHCHARTREVIEWLAB_PSY_A_CORE FT
Comprehensive Metabolic Panel in AM (06.03.21 @ 05:44)   Sodium, Serum: 132 mmol/L   Potassium, Serum: 3.3 mmol/L   Chloride, Serum: 95 mmol/L   Carbon Dioxide, Serum: 24.0 mmol/L   Anion Gap, Serum: 13 mmol/L   Blood Urea Nitrogen, Serum: 11.4 mg/dL   Creatinine, Serum: 0.76 mg/dL   Glucose, Serum: 96 mg/dL   Calcium, Total Serum: 9.0 mg/dL   Protein Total, Serum: 7.3 g/dL   Albumin, Serum: 3.5 g/dL   Bilirubin Total, Serum: 0.7 mg/dL   Alkaline Phosphatase, Serum: 82 U/L   Aspartate Aminotransferase (AST/SGOT): 98 U/L   Alanine Aminotransferase (ALT/SGPT): 206 U/L   eGFR if Non : 119: Interpretative comment   The units for eGFR are mL/min/1.73M2 (normalized body surface area). The   eGFR is calculated from a serum creatinine using the CKD-EPI equation.   
Comprehensive Metabolic Panel in AM (06.03.21 @ 05:44)   Sodium, Serum: 132 mmol/L   Potassium, Serum: 3.3 mmol/L   Chloride, Serum: 95 mmol/L   Carbon Dioxide, Serum: 24.0 mmol/L   Anion Gap, Serum: 13 mmol/L   Blood Urea Nitrogen, Serum: 11.4 mg/dL   Creatinine, Serum: 0.76 mg/dL   Glucose, Serum: 96 mg/dL   Calcium, Total Serum: 9.0 mg/dL   Protein Total, Serum: 7.3 g/dL   Albumin, Serum: 3.5 g/dL   Bilirubin Total, Serum: 0.7 mg/dL   Alkaline Phosphatase, Serum: 82 U/L   Aspartate Aminotransferase (AST/SGOT): 98 U/L   Alanine Aminotransferase (ALT/SGPT): 206 U/L   eGFR if Non : 119: Interpretative comment   The units for eGFR are mL/min/1.73M2 (normalized body surface area). The   eGFR is calculated from a serum creatinine using the CKD-EPI equation.

## 2021-06-09 NOTE — PROGRESS NOTE ADULT - SUBJECTIVE AND OBJECTIVE BOX
Patient is a 35y old  Male who presents with a chief complaint of SOB and fevers COVID PNA and PE (08 Jun 2021 11:27)    Pt seen and exam. Breathing stable RA. NO CP,SOB. STILL PARANOID    REVIEW OF SYSTEMS: All systems are reviewed and found to be negative except above    MEDICATIONS  (STANDING):  ALBUTerol    90 MICROgram(s) HFA Inhaler 2 Puff(s) Inhalation every 6 hours  apixaban 10 milliGRAM(s) Oral every 12 hours  dexAMETHasone  Injectable 6 milliGRAM(s) IV Push daily  dextrose 40% Gel 15 Gram(s) Oral once  dextrose 5%. 1000 milliLiter(s) (50 mL/Hr) IV Continuous <Continuous>  dextrose 5%. 1000 milliLiter(s) (100 mL/Hr) IV Continuous <Continuous>  dextrose 50% Injectable 25 Gram(s) IV Push once  dextrose 50% Injectable 12.5 Gram(s) IV Push once  dextrose 50% Injectable 25 Gram(s) IV Push once  glucagon  Injectable 1 milliGRAM(s) IntraMuscular once  insulin glargine Injectable (LANTUS) 5 Unit(s) SubCutaneous at bedtime  insulin lispro (ADMELOG) corrective regimen sliding scale   SubCutaneous three times a day before meals  insulin lispro Injectable (ADMELOG) 1 Unit(s) SubCutaneous three times a day before meals  lisinopril 5 milliGRAM(s) Oral daily  melatonin 5 milliGRAM(s) Oral at bedtime  risperiDONE   Tablet 1 milliGRAM(s) Oral at bedtime    MEDICATIONS  (PRN):  acetaminophen   Tablet .. 650 milliGRAM(s) Oral every 4 hours PRN Temp greater or equal to 38.5C (101.3F)  acetaminophen  Suppository .. 650 milliGRAM(s) Rectal every 4 hours PRN Temp greater or equal to 38.5C (101.3F)  benzonatate 100 milliGRAM(s) Oral every 8 hours PRN Cough  guaiFENesin Oral Liquid (Sugar-Free) 200 milliGRAM(s) Oral every 6 hours PRN Cough  LORazepam     Tablet 0.5 milliGRAM(s) Oral two times a day PRN Anxiety      CAPILLARY BLOOD GLUCOSE      POCT Blood Glucose.: 138 mg/dL (09 Jun 2021 11:07)  POCT Blood Glucose.: 105 mg/dL (09 Jun 2021 05:50)  POCT Blood Glucose.: 100 mg/dL (08 Jun 2021 21:20)  POCT Blood Glucose.: 103 mg/dL (08 Jun 2021 16:58)    I&O's Summary    08 Jun 2021 07:01  -  09 Jun 2021 07:00  --------------------------------------------------------  IN: 237 mL / OUT: 1200 mL / NET: -963 mL        PHYSICAL EXAM:  Vital Signs Last 24 Hrs  T(C): 37 (09 Jun 2021 12:08), Max: 37 (09 Jun 2021 12:08)  T(F): 98.6 (09 Jun 2021 12:08), Max: 98.6 (09 Jun 2021 12:08)  HR: 124 (09 Jun 2021 12:08) (103 - 124)  BP: 116/67 (09 Jun 2021 12:08) (109/67 - 152/94)  BP(mean): 72 (09 Jun 2021 07:00) (72 - 90)  RR: 20 (09 Jun 2021 12:08) (17 - 20)  SpO2: 95% (09 Jun 2021 12:08) (94% - 98%)    CONSTITUTIONAL: NAD,  EYES: PERRLA; conjunctiva and sclera clear  ENMT: Moist oral mucosa,   RESPIRATORY: Normal respiratory effort; lungs are clear to auscultation bilaterally  CARDIOVASCULAR: Regular rate and rhythm, normal S1 and S2, no murmur   EXTS: No lower extremity edema; Peripheral pulses are 2+ bilaterally  ABDOMEN: Nontender to palpation, normoactive bowel sounds, no rebound/guarding;   MUSCLOSKELETAL:   no clubbing or cyanosis of digits; no joint swelling or tenderness to palpation  PSYCH: CALM,Paranoid. no hallucination present now. denies suicidal/homicidal idea  NEUROLOGY: A+O to person, place, and time; +m/s      LABS:                        14.2   8.31  )-----------( 415      ( 09 Jun 2021 05:36 )             42.4     06-09    133<L>  |  97<L>  |  12.6  ----------------------------<  103<H>  4.6   |  25.0  |  0.77    Ca    9.5      09 Jun 2021 05:36    TPro  7.3  /  Alb  3.6  /  TBili  0.7  /  DBili  0.2  /  AST  110<H>  /  ALT  351<H>  /  AlkPhos  91  06-09    PT/INR - ( 09 Jun 2021 05:36 )   PT: 14.6 sec;   INR: 1.27 ratio                     RADIOLOGY & ADDITIONAL TESTS:  Results Reviewed:   Imaging Personally Reviewed:  Electrocardiogram Personally Reviewed:    COORDINATION OF CARE:  Care Discussed with Consultants/Other Providers [Y/N]:  Prior or Outpatient Records Reviewed [Y/N]:

## 2021-06-09 NOTE — DIETITIAN INITIAL EVALUATION ADULT. - OTHER INFO
35 year old male PMH of HTN presents for SOB x3 weeks associated with SOB worse w/ exertion associated with productive cough, chest heaviness, generalized weakness with subjective fevers. Admitted with acute hypoxic respiratory failure 2/2 COVID PNA and LLL PE. Pt with anxiety/depression and paranoid delusion per documentation. Attempted to interview, pt receiving nursing care. Currently on a cons cho, DASH/TLC diet- tolerating well with good po intake, noted to be consuming 100% of meals. RD to follow per protocol.

## 2021-06-09 NOTE — DIETITIAN INITIAL EVALUATION ADULT. - PERTINENT LABORATORY DATA
06-09 Na133 mmol/L<L> Glu 103 mg/dL<H> K+ 4.6 mmol/L Cr  0.77 mg/dL BUN 12.6 mg/dL Phos n/a   Alb 3.6 g/dL PAB n/a HgA1c 5.9%

## 2021-06-10 LAB
ALBUMIN SERPL ELPH-MCNC: 3.6 G/DL — SIGNIFICANT CHANGE UP (ref 3.3–5.2)
ALBUMIN SERPL ELPH-MCNC: 3.6 G/DL — SIGNIFICANT CHANGE UP (ref 3.3–5.2)
ALP SERPL-CCNC: 84 U/L — SIGNIFICANT CHANGE UP (ref 40–120)
ALP SERPL-CCNC: 84 U/L — SIGNIFICANT CHANGE UP (ref 40–120)
ALT FLD-CCNC: 383 U/L — HIGH
ALT FLD-CCNC: 384 U/L — HIGH
ANION GAP SERPL CALC-SCNC: 10 MMOL/L — SIGNIFICANT CHANGE UP (ref 5–17)
AST SERPL-CCNC: 128 U/L — HIGH
AST SERPL-CCNC: 129 U/L — HIGH
BILIRUB DIRECT SERPL-MCNC: 0.2 MG/DL — SIGNIFICANT CHANGE UP (ref 0–0.3)
BILIRUB INDIRECT FLD-MCNC: 0.6 MG/DL — SIGNIFICANT CHANGE UP (ref 0.2–1)
BILIRUB SERPL-MCNC: 0.8 MG/DL — SIGNIFICANT CHANGE UP (ref 0.4–2)
BILIRUB SERPL-MCNC: 0.8 MG/DL — SIGNIFICANT CHANGE UP (ref 0.4–2)
BUN SERPL-MCNC: 9.4 MG/DL — SIGNIFICANT CHANGE UP (ref 8–20)
CALCIUM SERPL-MCNC: 9.3 MG/DL — SIGNIFICANT CHANGE UP (ref 8.6–10.2)
CHLORIDE SERPL-SCNC: 96 MMOL/L — LOW (ref 98–107)
CO2 SERPL-SCNC: 25 MMOL/L — SIGNIFICANT CHANGE UP (ref 22–29)
CREAT SERPL-MCNC: 0.7 MG/DL — SIGNIFICANT CHANGE UP (ref 0.5–1.3)
GLUCOSE BLDC GLUCOMTR-MCNC: 100 MG/DL — HIGH (ref 70–99)
GLUCOSE BLDC GLUCOMTR-MCNC: 102 MG/DL — HIGH (ref 70–99)
GLUCOSE BLDC GLUCOMTR-MCNC: 117 MG/DL — HIGH (ref 70–99)
GLUCOSE BLDC GLUCOMTR-MCNC: 126 MG/DL — HIGH (ref 70–99)
GLUCOSE BLDC GLUCOMTR-MCNC: 142 MG/DL — HIGH (ref 70–99)
GLUCOSE SERPL-MCNC: 105 MG/DL — HIGH (ref 70–99)
HCT VFR BLD CALC: 41 % — SIGNIFICANT CHANGE UP (ref 39–50)
HGB BLD-MCNC: 13.6 G/DL — SIGNIFICANT CHANGE UP (ref 13–17)
INR BLD: 1.5 RATIO — HIGH (ref 0.88–1.16)
MCHC RBC-ENTMCNC: 30.6 PG — SIGNIFICANT CHANGE UP (ref 27–34)
MCHC RBC-ENTMCNC: 33.2 GM/DL — SIGNIFICANT CHANGE UP (ref 32–36)
MCV RBC AUTO: 92.1 FL — SIGNIFICANT CHANGE UP (ref 80–100)
PLATELET # BLD AUTO: 362 K/UL — SIGNIFICANT CHANGE UP (ref 150–400)
POTASSIUM SERPL-MCNC: 4.2 MMOL/L — SIGNIFICANT CHANGE UP (ref 3.5–5.3)
POTASSIUM SERPL-SCNC: 4.2 MMOL/L — SIGNIFICANT CHANGE UP (ref 3.5–5.3)
PROT SERPL-MCNC: 6.8 G/DL — SIGNIFICANT CHANGE UP (ref 6.6–8.7)
PROT SERPL-MCNC: 6.8 G/DL — SIGNIFICANT CHANGE UP (ref 6.6–8.7)
PROTHROM AB SERPL-ACNC: 17.1 SEC — HIGH (ref 10.6–13.6)
RBC # BLD: 4.45 M/UL — SIGNIFICANT CHANGE UP (ref 4.2–5.8)
RBC # FLD: 12.7 % — SIGNIFICANT CHANGE UP (ref 10.3–14.5)
SARS-COV-2 RNA SPEC QL NAA+PROBE: SIGNIFICANT CHANGE UP
SODIUM SERPL-SCNC: 131 MMOL/L — LOW (ref 135–145)
WBC # BLD: 6.98 K/UL — SIGNIFICANT CHANGE UP (ref 3.8–10.5)
WBC # FLD AUTO: 6.98 K/UL — SIGNIFICANT CHANGE UP (ref 3.8–10.5)

## 2021-06-10 PROCEDURE — 99232 SBSQ HOSP IP/OBS MODERATE 35: CPT

## 2021-06-10 RX ORDER — HALOPERIDOL DECANOATE 100 MG/ML
5 INJECTION INTRAMUSCULAR EVERY 6 HOURS
Refills: 0 | Status: DISCONTINUED | OUTPATIENT
Start: 2021-06-10 | End: 2021-06-11

## 2021-06-10 RX ORDER — DIPHENHYDRAMINE HCL 50 MG
50 CAPSULE ORAL EVERY 6 HOURS
Refills: 0 | Status: DISCONTINUED | OUTPATIENT
Start: 2021-06-10 | End: 2021-06-11

## 2021-06-10 RX ORDER — RISPERIDONE 4 MG/1
1 TABLET ORAL
Refills: 0 | Status: DISCONTINUED | OUTPATIENT
Start: 2021-06-10 | End: 2021-06-11

## 2021-06-10 RX ADMIN — HALOPERIDOL DECANOATE 5 MILLIGRAM(S): 100 INJECTION INTRAMUSCULAR at 22:50

## 2021-06-10 RX ADMIN — Medication 50 MILLIGRAM(S): at 22:50

## 2021-06-10 RX ADMIN — LISINOPRIL 5 MILLIGRAM(S): 2.5 TABLET ORAL at 06:51

## 2021-06-10 RX ADMIN — Medication 0.5 MILLIGRAM(S): at 22:49

## 2021-06-10 RX ADMIN — Medication 6 MILLIGRAM(S): at 06:50

## 2021-06-10 RX ADMIN — RISPERIDONE 1 MILLIGRAM(S): 4 TABLET ORAL at 08:40

## 2021-06-10 RX ADMIN — Medication 200 MILLIGRAM(S): at 23:10

## 2021-06-10 RX ADMIN — APIXABAN 10 MILLIGRAM(S): 2.5 TABLET, FILM COATED ORAL at 06:48

## 2021-06-10 RX ADMIN — RISPERIDONE 1 MILLIGRAM(S): 4 TABLET ORAL at 17:23

## 2021-06-10 RX ADMIN — Medication 100 MILLIGRAM(S): at 06:49

## 2021-06-10 RX ADMIN — Medication 1 MILLIGRAM(S): at 02:38

## 2021-06-10 RX ADMIN — INSULIN GLARGINE 5 UNIT(S): 100 INJECTION, SOLUTION SUBCUTANEOUS at 22:48

## 2021-06-10 RX ADMIN — Medication 100 MILLIGRAM(S): at 22:45

## 2021-06-10 RX ADMIN — HALOPERIDOL DECANOATE 5 MILLIGRAM(S): 100 INJECTION INTRAMUSCULAR at 17:41

## 2021-06-10 RX ADMIN — Medication 50 MILLIGRAM(S): at 17:41

## 2021-06-10 NOTE — PROGRESS NOTE ADULT - REASON FOR ADMISSION
SOB and fevers COVID PNA and PE

## 2021-06-10 NOTE — PROGRESS NOTE ADULT - SUBJECTIVE AND OBJECTIVE BOX
Patient is a 35y old  Male who presents with a chief complaint of SOB and fevers COVID PNA and PE (09 Jun 2021 13:47)  Breathing stable. Pt is having paranoid thought.  ambulatory so2 stable.    REVIEW OF SYSTEMS: All systems are reviewed and found to be negative except above    MEDICATIONS  (STANDING):  ALBUTerol    90 MICROgram(s) HFA Inhaler 2 Puff(s) Inhalation every 6 hours  dexAMETHasone  Injectable 6 milliGRAM(s) IV Push daily  dextrose 40% Gel 15 Gram(s) Oral once  dextrose 5%. 1000 milliLiter(s) (50 mL/Hr) IV Continuous <Continuous>  dextrose 5%. 1000 milliLiter(s) (100 mL/Hr) IV Continuous <Continuous>  dextrose 50% Injectable 25 Gram(s) IV Push once  dextrose 50% Injectable 12.5 Gram(s) IV Push once  dextrose 50% Injectable 25 Gram(s) IV Push once  glucagon  Injectable 1 milliGRAM(s) IntraMuscular once  insulin glargine Injectable (LANTUS) 5 Unit(s) SubCutaneous at bedtime  insulin lispro (ADMELOG) corrective regimen sliding scale   SubCutaneous three times a day before meals  insulin lispro Injectable (ADMELOG) 1 Unit(s) SubCutaneous three times a day before meals  lisinopril 5 milliGRAM(s) Oral daily  melatonin 5 milliGRAM(s) Oral at bedtime  risperiDONE   Tablet 1 milliGRAM(s) Oral two times a day    MEDICATIONS  (PRN):  acetaminophen   Tablet .. 650 milliGRAM(s) Oral every 4 hours PRN Temp greater or equal to 38.5C (101.3F)  acetaminophen  Suppository .. 650 milliGRAM(s) Rectal every 4 hours PRN Temp greater or equal to 38.5C (101.3F)  benzonatate 100 milliGRAM(s) Oral every 8 hours PRN Cough  guaiFENesin Oral Liquid (Sugar-Free) 200 milliGRAM(s) Oral every 6 hours PRN Cough  LORazepam     Tablet 0.5 milliGRAM(s) Oral two times a day PRN Anxiety      CAPILLARY BLOOD GLUCOSE      POCT Blood Glucose.: 126 mg/dL (10 Adin 2021 11:27)  POCT Blood Glucose.: 102 mg/dL (10 Adin 2021 07:35)  POCT Blood Glucose.: 100 mg/dL (10 Adin 2021 01:24)  POCT Blood Glucose.: 110 mg/dL (09 Jun 2021 21:25)  POCT Blood Glucose.: 116 mg/dL (09 Jun 2021 16:20)    I&O's Summary    09 Jun 2021 07:01  -  10 Adin 2021 07:00  --------------------------------------------------------  IN: 0 mL / OUT: 1165 mL / NET: -1165 mL        PHYSICAL EXAM:  Vital Signs Last 24 Hrs  T(C): 35.9 (10 Adin 2021 12:00), Max: 37 (09 Jun 2021 16:20)  T(F): 96.7 (10 Adin 2021 12:00), Max: 98.6 (09 Jun 2021 16:20)  HR: 120 (10 Adin 2021 12:00) (105 - 128)  BP: 119/86 (10 Adin 2021 12:00) (116/77 - 158/96)  BP(mean): 94 (10 Adin 2021 12:00) (72 - 107)  RR: 18 (10 Adin 2021 12:00) (18 - 24)  SpO2: 94% (10 Adin 2021 12:00) (94% - 117%)    CONSTITUTIONAL: NAD,  EYES: PERRLA; conjunctiva and sclera clear  ENMT: Moist oral mucosa,   RESPIRATORY: Normal respiratory effort; lungs are clear to auscultation bilaterally  CARDIOVASCULAR: Regular rate and rhythm, normal S1 and S2, no murmur   EXTS: No lower extremity edema; Peripheral pulses are 2+ bilaterally  ABDOMEN: Nontender to palpation, normoactive bowel sounds, no rebound/guarding;   MUSCLOSKELETAL:   no clubbing or cyanosis of digits; no joint swelling or tenderness to palpation  PSYCH: calm, paranoid thought  NEUROLOGY: A+O to person, place, and time; no focal deficits      LABS:                        13.6   6.98  )-----------( 362      ( 10 Adin 2021 08:15 )             41.0     06-10    131<L>  |  96<L>  |  9.4  ----------------------------<  105<H>  4.2   |  25.0  |  0.70    Ca    9.3      10 Adin 2021 08:15    TPro  6.8  /  Alb  3.6  /  TBili  0.8  /  DBili  0.2  /  AST  129<H>  /  ALT  383<H>  /  AlkPhos  84  06-10    PT/INR - ( 10 Adin 2021 08:15 )   PT: 17.1 sec;   INR: 1.50 ratio                     RADIOLOGY & ADDITIONAL TESTS:  Results Reviewed:   Imaging Personally Reviewed:  Electrocardiogram Personally Reviewed:    COORDINATION OF CARE:  Care Discussed with Consultants/Other Providers [Y/N]:  Prior or Outpatient Records Reviewed [Y/N]:

## 2021-06-10 NOTE — PROGRESS NOTE ADULT - PROVIDER SPECIALTY LIST ADULT
Hospitalist
Infectious Disease
Hospitalist
Hospitalist
Infectious Disease
Hospitalist

## 2021-06-10 NOTE — PROGRESS NOTE ADULT - ASSESSMENT
36yo M w/pmh HTN presents for SOB x3 weeks associated with SOB worse w/ exertion assoc/w productive cough, chest heaviness, generalized weakness with subjective fevers. Denies n/v, abdominal pain. Reports self-isolating since then. Denies known COVID contacts however 4 days prior to symptoms pt went to a restaurant for dinner and says it was busy. In ED found ot have COVID PNA and Left Lower Lobe PE on CT.      Acute hypoxic respiratory failure 2/2 COVID 19 PNA and LLL PE    - c/w supplemental O2 as needed to maintain O2 sat >90%  - c/w bronchodilators incentive spirometry and frequent proning  - dexamethasone 6mg IV daily and hold remdesivir for transaminitis. will dc steroid   - spoke with RN  Ambulatory so2 stable.       Provoked acute Left Lower Lobe Pulmonary Embolism  - Likely 2/2 COVID 19  - continue  eliquis started 05/03   - TTE noted.  No evidence of Rt heart strain      Hypokalemia  - Supplemented  - Will monitor electrolytes and supplement as needed  - Goal K~4      HTN  - c/w Lisinopril 5mg qd   - Monitor BP      Sinus tachycardia  - Improving  - Multifactorial 2/2 COVID PNA, PE, anxiety       DM II  - A1c 5.9  - ISS and hypoglycemic protocol on board       Hx of ETOH abuse  - Last drink 3 weeks ago so no risk of withdrawal at this time  - Counseled on abstaining from etoh use      Anxiety/depression, paranoid delusion  -spoke with psych,add risperidone bid , may need in pt psych if does not improve   - dc  Lexapro   - f/u  psych rec  -1:1 observation      VTE: eliquis   pt is medically stable to discharge psych unit  moris fonseca  Care of plan dw pt  moris rn  moris fonseca 36yo M w/pmh HTN presents for SOB x3 weeks associated with SOB worse w/ exertion assoc/w productive cough, chest heaviness, generalized weakness with subjective fevers. Denies n/v, abdominal pain. Reports self-isolating since then. Denies known COVID contacts however 4 days prior to symptoms pt went to a restaurant for dinner and says it was busy. In ED found ot have COVID PNA and Left Lower Lobe PE on CT.      Acute hypoxic respiratory failure 2/2 COVID 19 PNA and LLL PE    - c/w supplemental O2 as needed to maintain O2 sat >90%  - c/w bronchodilators incentive spirometry and frequent proning  - dexamethasone 6mg IV daily and hold remdesivir for transaminitis. will dc steroid   - spoke with RN  Ambulatory so2 stable.       Provoked acute Left Lower Lobe Pulmonary Embolism  - Likely 2/2 COVID 19  - continue  eliquis started 05/03   - TTE noted.  No evidence of Rt heart strain      Hypokalemia  - Supplemented  - Will monitor electrolytes and supplement as needed  - Goal K~4      HTN  - c/w Lisinopril 5mg qd   - Monitor BP      Sinus tachycardia  - Improving  - Multifactorial 2/2 COVID PNA, PE, anxiety       Prediabetic   - A1c 5.9  - ISS and hypoglycemic protocol on board       Hx of ETOH abuse  - Last drink 3 weeks ago so no risk of withdrawal at this time  - Counseled on abstaining from etoh use      Anxiety/depression, paranoid delusion  -spoke with psych,add risperidone bid , may need in pt psych if does not improve   - dc  Lexapro   - f/u  psych rec  -1:1 observation      VTE: eliquis   pt is medically stable to discharge psych unit  moris fonseca  Care of plan dw pt  moris rn  moris fonseca

## 2021-06-10 NOTE — CHART NOTE - NSCHARTNOTEFT_GEN_A_CORE
Pt with paranoid delusional behavior, Behavioral health following for Psychosis  Pt anxious at this time  Unable to take Ativan PO as ordered for anxiety  Pt NAD   VSS B/P 140/51 P 122 R 20 % RA T 96.4  No medical compaints at this time   Ativan 1 mg IVP x 1   Continue to monitor

## 2021-06-10 NOTE — BH CONSULTATION LIAISON PROGRESS NOTE - NSBHMSEKNOWHOW_PSY_ALL_CORE
Current Events
Current Events/Educational attainment/Vocabulary
Current Events/Educational attainment/Vocabulary

## 2021-06-11 ENCOUNTER — EMERGENCY (EMERGENCY)
Facility: HOSPITAL | Age: 36
LOS: 1 days | Discharge: HOME | End: 2021-06-11
Attending: EMERGENCY MEDICINE
Payer: MEDICAID

## 2021-06-11 ENCOUNTER — TRANSCRIPTION ENCOUNTER (OUTPATIENT)
Age: 36
End: 2021-06-11

## 2021-06-11 VITALS
TEMPERATURE: 98 F | HEART RATE: 94 BPM | SYSTOLIC BLOOD PRESSURE: 122 MMHG | DIASTOLIC BLOOD PRESSURE: 80 MMHG | RESPIRATION RATE: 20 BRPM | OXYGEN SATURATION: 96 %

## 2021-06-11 VITALS
HEIGHT: 70 IN | TEMPERATURE: 99 F | RESPIRATION RATE: 20 BRPM | SYSTOLIC BLOOD PRESSURE: 169 MMHG | OXYGEN SATURATION: 95 % | WEIGHT: 229.94 LBS | HEART RATE: 95 BPM | DIASTOLIC BLOOD PRESSURE: 89 MMHG

## 2021-06-11 LAB
GLUCOSE BLDC GLUCOMTR-MCNC: 110 MG/DL — HIGH (ref 70–99)
GLUCOSE BLDC GLUCOMTR-MCNC: 112 MG/DL — HIGH (ref 70–99)
HCT VFR BLD CALC: 39.1 % — SIGNIFICANT CHANGE UP (ref 39–50)
HGB BLD-MCNC: 13.2 G/DL — SIGNIFICANT CHANGE UP (ref 13–17)
MCHC RBC-ENTMCNC: 31.2 PG — SIGNIFICANT CHANGE UP (ref 27–34)
MCHC RBC-ENTMCNC: 33.8 GM/DL — SIGNIFICANT CHANGE UP (ref 32–36)
MCV RBC AUTO: 92.4 FL — SIGNIFICANT CHANGE UP (ref 80–100)
PLATELET # BLD AUTO: 335 K/UL — SIGNIFICANT CHANGE UP (ref 150–400)
RBC # BLD: 4.23 M/UL — SIGNIFICANT CHANGE UP (ref 4.2–5.8)
RBC # FLD: 13 % — SIGNIFICANT CHANGE UP (ref 10.3–14.5)
WBC # BLD: 7.24 K/UL — SIGNIFICANT CHANGE UP (ref 3.8–10.5)
WBC # FLD AUTO: 7.24 K/UL — SIGNIFICANT CHANGE UP (ref 3.8–10.5)

## 2021-06-11 PROCEDURE — 99284 EMERGENCY DEPT VISIT MOD MDM: CPT

## 2021-06-11 PROCEDURE — 99282 EMERGENCY DEPT VISIT SF MDM: CPT

## 2021-06-11 PROCEDURE — 99239 HOSP IP/OBS DSCHRG MGMT >30: CPT

## 2021-06-11 PROCEDURE — 99232 SBSQ HOSP IP/OBS MODERATE 35: CPT

## 2021-06-11 RX ORDER — LISINOPRIL 2.5 MG/1
1 TABLET ORAL
Qty: 0 | Refills: 0 | DISCHARGE
Start: 2021-06-11

## 2021-06-11 RX ORDER — APIXABAN 2.5 MG/1
1 TABLET, FILM COATED ORAL
Qty: 60 | Refills: 0
Start: 2021-06-11 | End: 2021-07-10

## 2021-06-11 RX ORDER — METFORMIN HYDROCHLORIDE 850 MG/1
1 TABLET ORAL
Qty: 30 | Refills: 0
Start: 2021-06-11 | End: 2021-07-10

## 2021-06-11 RX ORDER — ALBUTEROL 90 UG/1
2 AEROSOL, METERED ORAL
Qty: 1 | Refills: 0
Start: 2021-06-11

## 2021-06-11 RX ORDER — RISPERIDONE 4 MG/1
1 TABLET ORAL
Qty: 60 | Refills: 0
Start: 2021-06-11 | End: 2021-07-10

## 2021-06-11 RX ORDER — APIXABAN 2.5 MG/1
10 TABLET, FILM COATED ORAL EVERY 12 HOURS
Refills: 0 | Status: DISCONTINUED | OUTPATIENT
Start: 2021-06-11 | End: 2021-06-11

## 2021-06-11 RX ORDER — RISPERIDONE 4 MG/1
1 TABLET ORAL
Qty: 0 | Refills: 0 | DISCHARGE
Start: 2021-06-11

## 2021-06-11 RX ORDER — LISINOPRIL 2.5 MG/1
1 TABLET ORAL
Qty: 30 | Refills: 0
Start: 2021-06-11 | End: 2021-07-10

## 2021-06-11 RX ADMIN — Medication 200 MILLIGRAM(S): at 07:50

## 2021-06-11 RX ADMIN — Medication 6 MILLIGRAM(S): at 06:02

## 2021-06-11 RX ADMIN — LISINOPRIL 5 MILLIGRAM(S): 2.5 TABLET ORAL at 06:02

## 2021-06-11 RX ADMIN — RISPERIDONE 1 MILLIGRAM(S): 4 TABLET ORAL at 17:17

## 2021-06-11 RX ADMIN — Medication 100 MILLIGRAM(S): at 06:51

## 2021-06-11 RX ADMIN — RISPERIDONE 1 MILLIGRAM(S): 4 TABLET ORAL at 06:02

## 2021-06-11 NOTE — ED ADULT TRIAGE NOTE - CHIEF COMPLAINT QUOTE
patient in no distress was recently DC home states that she is feeling SOB states worried about dying want re-eval, Pox 95 % on RA is + COVID patient in no distress was recently DC home states that she is feeling SOB states worried about dying want re-eval, Pox 95 % on RA is + COVID as per MD negative covid

## 2021-06-11 NOTE — ED PROVIDER NOTE - OBJECTIVE STATEMENT
35 year old male presents c/o shortness of breath. Pt was here at [facility] 06/01 to this evening. Pt states he was discharged today, as he walked outside he was waiting for his cab when he felt like the "cold air" made him feel short of breath for a moment. He decided not to take his cab home and instead returned to ED for new triage. He denies chest pain, fever/chills, back pain, abdominal pain.

## 2021-06-11 NOTE — ED PROVIDER NOTE - ATTENDING CONTRIBUTION TO CARE
35yoM; with pmh signif for COVID recent infection(complicated by PE on Apixaban), HTN, Schizoaffective; now p/w sob. patient was discharged today and was waiting for bus outside when he felt sob. denies cp. denies f/c/s. denies palpitations.  General:     NAD, well-nourished, well-appearing  Head:     NC/AT, EOMI, oral mucosa moist  Neck:     trachea midline  Lungs:     CTA b/l, no w/r/r  CVS:     S1S2, RRR, no m/g/r  Abd:     +BS, s/nt/nd, no organomegaly  Ext:    2+ radial and pedal pulses, no c/c/e  Neuro: AAOx3, no sensory/motor deficits  A/P: 35yoM p/w sob  -pulse ox normal  -normal exam  -dc with pulse ox, f/up with pmd. continue meds as prescribed.
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
## 2021-06-11 NOTE — BH CONSULTATION LIAISON PROGRESS NOTE - NSBHASSESSMENTFT_PSY_ALL_CORE
Seen for follow up.  Continues to reports s/s psychosis/paranoia, within past 24 hrs.  This am Pt reported to RN a list of 3 people he did not want in his room due to "fear for his life".  Pt now claims he felt that way last night however reported this to RN this am.  States he wants to g home where he is safe.  Advised he should stay for overnight for am reeval due to ongoing paranoia.  Spoke with mother who has concerns about safety, however, seems is in regards to medical condition.  Denies AH/SI/HI.  Hold for reeval in am.  May medicate with agitation kit as needed for severe agitation.  Haldol 5IM, Ativan 2 IM and Benadryl 50 IM
Patient alert and oriented X4; with "good" mood an pleasant affect, interacting appropriately with undersigned. He is well-related; denying current suicidal ideations, suicidal plans or current intent to die, citing his family as protective factors. States "I don't want to hurt my family, my mom and my sister". Additionally, he states he loves life and has a lot to live for. He states he wants to get  and have children, noting "I want to enjoy life". He denies current hallucinations, delusions, paranoia and no evidence of paranoid or delusional thoughts noted. He reports current good sleep pattern and good appetite. He states he works form home as a  since 2013. Of note, patient reports a history of drinking one bottle of wine daily and also vapes daily. He denies illicit drug use.     IMPRESSION: Patient is calm and well-related throughout this assessment. He denies acute suicidality, homicidality, paranoia, hallucinations and none noted. He does not appear to be an acute risk to self or others to warrant inpatient psychiatric admission. Patient appears stable for discharge. Additionally, collateral phone call with patient's mother and sister today did not reveal any safety concerns related to his mental health. According to his mother (Karla Sylvester [phone], patient has no past psychiatric history and that she does not believe he will benefit from inpatient psychiatric admission at this time. According to phone conversation with patient's sister today, Ms. Yadi Sylvester reported that patient has no prior psychiatric history and that he sounds like himself when she spoke to him. She further added that her brother has no past history of paranoia or hallucinations and was fine prior to being admitted to the hospital.
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   Psychiatry recalled to evaluated for possible paranoia     Patient seen and evaluated and currently presents with significant paranoia and belief that several staff members are attempting to kill him. Patient is hypervigilant, does not feel safe and expressing hearing others talk about him which are likely auditory hallucinations. Patients history is unclear with a self report of remote treatment in 2009 and questionable psychosis.     6/9  Patient seen for follow up today and found to be more calm and with a brighter affect. Patient today denying initial beliefs of fearing for his life and denies belief someone is trying to hurt him but was again oddly related and expressing beliefs of time travel and having unique abilities but then appears to be reality testing these thoughts. Patient also receiving dexamethasone, can also consider steroid induced psychosis.     Dx.   Psychosis     Recommendations:   Would keep on 1 to 1 observation for now   recommend to increase Risperdal to 1mg PO Q12 hours for psychosis   EKG done and admission and QTC noted.   Cl psychiatry to follow  
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   Psychiatry recalled to evaluated for possible paranoia     Patient seen and evaluated and currently presents with significant paranoia and belief that several staff members are attempting to kill him. Patient is hypervigilant, does not feel safe and expressing hearing others talk about him which are likely auditory hallucinations. Patients history is unclear with a self report of remote treatment in 2009 and questionable psychosis.     Recommendations:   Would keep on 1 to 1 observation for now   Recommend to Start Risperdal 1mg PO QHS for psychosis   EKG done and admission and QTC noted.   Cl psychiatry to follow

## 2021-06-11 NOTE — BH CONSULTATION LIAISON PROGRESS NOTE - MSE UNSTRUCTURED FT
Patient alert and oriented X4; with "good" mood an pleasant affect, interacting appropriately with undersigned. He is well-related; denying current suicidal ideations, suicidal plans or current intent to die, citing his family as protective factors. States "I don't want to hurt my family, my mom and my sister". Additionally, he states he loves life and has a lot to live for. He states he wants to get  and have children, noting "I want to enjoy life". He denies current hallucinations, delusions, paranoia and no evidence of paranoid or delusional thoughts noted. He reports current good sleep pattern and good appetite. He states he works form home as a  since 2013. Of note, patient reports a history of drinking one bottle of wine daily and also vapes daily. He denies illicit drug use.     IMPRESSION: Patient is calm and well-related throughout this assessment. He denies acute suicidality, homicidality, paranoia, hallucinations and none noted. He does not appear to be an acute risk to self or others to warrant inpatient psychiatric admission. Patient appears stable for discharge. Additionally, collateral phone call with patient's mother and sister today did not reveal any safety concerns related to his mental health. According to his mother (Karla Sylvester [phone], patient has no past psychiatric history and that she does not believe he will benefit from inpatient psychiatric admission at this time. According to phone conversation with patient's sister today, Ms. Yadi Sylvester reported that patient has no prior psychiatric history and that he sounds like himself when she spoke to him. She further added that her brother has no past history of paranoia or hallucinations and was fine prior to being admitted to the hospital.    PLAN: Patient may be discharged home. Dr. Roth is in agreement with the plan to discharge patient. Patient alert and oriented X4; with "good" mood an pleasant affect, interacting appropriately with undersigned. He is well-related; denying current suicidal ideations, suicidal plans or current intent to die, citing his family as protective factors. States "I don't want to hurt my family, my mom and my sister". Additionally, he states he loves life and has a lot to live for. He states he wants to get  and have children, noting "I want to enjoy life". He denies current hallucinations, delusions, paranoia and no evidence of paranoid or delusional thoughts noted. He reports current good sleep pattern and good appetite. He states he works form home as a  since 2013. Of note, patient reports a history of drinking one bottle of wine daily and also vapes daily. He denies illicit drug use.     IMPRESSION: Patient is calm and well-related throughout this assessment. He denies acute suicidality, homicidality, paranoia, hallucinations and none noted. He does not appear to be an acute risk to self or others to warrant inpatient psychiatric admission. Patient appears stable for discharge. Additionally, collateral phone call with patient's mother and sister today did not reveal any safety concerns related to his mental health. According to his mother (Karla Sylvester [phone], patient has no past psychiatric history and that she does not believe he will benefit from inpatient psychiatric admission at this time. According to phone conversation with patient's sister today, Ms. Yadi Sylvester reported that patient has no prior psychiatric history and that he sounds like himself when she spoke to him. She further added that her brother has no past history of paranoia or hallucinations and was fine prior to being admitted to the hospital.    PLAN:   Patient does not appear to be delusional, paranoia and has no acute symptoms of hallucinations. He will most likely not benefit from Risperdal treatment at this time. His reported paranoid thoughts and hallucinations while in the hospital appear to have been in the context of delirium from his medical comorbidities rather that a prior psychiatric condition. Patient may be discharged home when medically cleared. Dr. Roth is in agreement with the plan to discharge patient. Patient alert and oriented X4; with "good" mood an pleasant affect, interacting appropriately with undersigned. He is well-related; denying current suicidal ideations, suicidal plans or current intent to die, citing his family as protective factors. States "I don't want to hurt my family, my mom and my sister". Additionally, he states he loves life and has a lot to live for. He states he wants to get  and have children, noting "I want to enjoy life". He denies current hallucinations, delusions, paranoia and no evidence of paranoid or delusional thoughts noted. He reports current good sleep pattern and good appetite. He states he works form home as a  since 2013. Of note, patient reports a history of drinking one bottle of wine daily and also vapes daily. He denies illicit drug use.     IMPRESSION: Patient is calm and well-related throughout this assessment. He denies acute suicidality, homicidality, paranoia, hallucinations and none noted. He does not appear to be an acute risk to self or others to warrant inpatient psychiatric admission. Patient appears stable for discharge. Additionally, collateral phone call with patient's mother and sister today did not reveal any safety concerns related to his mental health. According to his mother (Karla Sylvester [phone], patient has no past psychiatric history and that she does not believe he will benefit from inpatient psychiatric admission at this time. According to phone conversation with patient's sister today, Ms. Yadi Sylvester reported that patient has no prior psychiatric history and that he sounds like himself when she spoke to him. She further added that her brother has no past history of paranoia or hallucinations and was fine prior to being admitted to the hospital.    PLAN:   Patient does not appear to be delusional, paranoia and has no acute symptoms of hallucinations. He will most likely not benefit from Risperdal treatment at this time. His reported paranoid thoughts and hallucinations while in the hospital appear to have been in the context of delirium from alcohol withdrawals given his reports of daily alcohol use rather that a prior psychiatric condition. Patient may be discharged home when medically cleared. Dr. Roth is in agreement with the plan to discharge patient.

## 2021-06-11 NOTE — DISCHARGE NOTE NURSING/CASE MANAGEMENT/SOCIAL WORK - PATIENT PORTAL LINK FT
You can access the FollowMyHealth Patient Portal offered by [facility] by registering at the following website: [email or web address]. By joining [facility]’s FollowMyHealth portal, you will also be able to view your health information using other applications (apps) compatible with our system.

## 2021-06-11 NOTE — BH CONSULTATION LIAISON PROGRESS NOTE - NSBHFUPINTERVALCCFT_PSY_A_CORE
"I heard people talking about me through the wall yesterday and thought people were going to kill me last night".
"I am ok; I am not paranoid"

## 2021-06-11 NOTE — ED ADULT NURSE NOTE - CAS ELECT INFOMATION PROVIDED
DC instructions provided and reviewed with patient, verbalizing understanding. Pt with HR 110s and pulse ox 94% on RA prior to DC, okay for DC per CECILE Boggs.  Pt provided with pulse oximeter. Pt with no apparent acute distress observed at time of discharge. Pt ambulatory to exit, safety maintained in ED./DC instructions

## 2021-06-11 NOTE — ED PROVIDER NOTE - PHYSICAL EXAMINATION
spo2 maintaining above 96 at room air resting comfortably   after ambulating 1 minute spo2 remained above 95

## 2021-06-11 NOTE — BH CONSULTATION LIAISON PROGRESS NOTE - CURRENT MEDICATION
MEDICATIONS  (STANDING):  ALBUTerol    90 MICROgram(s) HFA Inhaler 2 Puff(s) Inhalation every 6 hours  apixaban 10 milliGRAM(s) Oral every 12 hours  dexAMETHasone  Injectable 6 milliGRAM(s) IV Push daily  dextrose 40% Gel 15 Gram(s) Oral once  dextrose 5%. 1000 milliLiter(s) (50 mL/Hr) IV Continuous <Continuous>  dextrose 5%. 1000 milliLiter(s) (100 mL/Hr) IV Continuous <Continuous>  dextrose 50% Injectable 25 Gram(s) IV Push once  dextrose 50% Injectable 12.5 Gram(s) IV Push once  dextrose 50% Injectable 25 Gram(s) IV Push once  glucagon  Injectable 1 milliGRAM(s) IntraMuscular once  insulin glargine Injectable (LANTUS) 5 Unit(s) SubCutaneous at bedtime  insulin lispro (ADMELOG) corrective regimen sliding scale   SubCutaneous three times a day before meals  insulin lispro Injectable (ADMELOG) 1 Unit(s) SubCutaneous three times a day before meals  lisinopril 5 milliGRAM(s) Oral daily  melatonin 5 milliGRAM(s) Oral at bedtime  risperiDONE   Tablet 1 milliGRAM(s) Oral at bedtime    MEDICATIONS  (PRN):  acetaminophen   Tablet .. 650 milliGRAM(s) Oral every 4 hours PRN Temp greater or equal to 38.5C (101.3F)  acetaminophen  Suppository .. 650 milliGRAM(s) Rectal every 4 hours PRN Temp greater or equal to 38.5C (101.3F)  benzonatate 100 milliGRAM(s) Oral every 8 hours PRN Cough  guaiFENesin Oral Liquid (Sugar-Free) 200 milliGRAM(s) Oral every 6 hours PRN Cough  LORazepam     Tablet 0.5 milliGRAM(s) Oral two times a day PRN Anxiety  
MEDICATIONS  (STANDING):  ALBUTerol    90 MICROgram(s) HFA Inhaler 2 Puff(s) Inhalation every 6 hours  apixaban 10 milliGRAM(s) Oral every 12 hours  dextrose 40% Gel 15 Gram(s) Oral once  dextrose 5%. 1000 milliLiter(s) (50 mL/Hr) IV Continuous <Continuous>  dextrose 5%. 1000 milliLiter(s) (100 mL/Hr) IV Continuous <Continuous>  dextrose 50% Injectable 25 Gram(s) IV Push once  dextrose 50% Injectable 12.5 Gram(s) IV Push once  dextrose 50% Injectable 25 Gram(s) IV Push once  glucagon  Injectable 1 milliGRAM(s) IntraMuscular once  insulin glargine Injectable (LANTUS) 5 Unit(s) SubCutaneous at bedtime  insulin lispro (ADMELOG) corrective regimen sliding scale   SubCutaneous three times a day before meals  insulin lispro Injectable (ADMELOG) 1 Unit(s) SubCutaneous three times a day before meals  lisinopril 5 milliGRAM(s) Oral daily  melatonin 5 milliGRAM(s) Oral at bedtime  risperiDONE   Tablet 1 milliGRAM(s) Oral two times a day    MEDICATIONS  (PRN):  acetaminophen   Tablet .. 650 milliGRAM(s) Oral every 4 hours PRN Temp greater or equal to 38.5C (101.3F)  acetaminophen  Suppository .. 650 milliGRAM(s) Rectal every 4 hours PRN Temp greater or equal to 38.5C (101.3F)  benzonatate 100 milliGRAM(s) Oral every 8 hours PRN Cough  diphenhydrAMINE   Injectable 50 milliGRAM(s) IntraMuscular every 6 hours PRN Combative behavior  guaiFENesin Oral Liquid (Sugar-Free) 200 milliGRAM(s) Oral every 6 hours PRN Cough  haloperidol    Injectable 5 milliGRAM(s) IntraMuscular every 6 hours PRN agitation  
MEDICATIONS  (STANDING):  ALBUTerol    90 MICROgram(s) HFA Inhaler 2 Puff(s) Inhalation every 6 hours  dexAMETHasone  Injectable 6 milliGRAM(s) IV Push daily  dextrose 40% Gel 15 Gram(s) Oral once  dextrose 5%. 1000 milliLiter(s) (50 mL/Hr) IV Continuous <Continuous>  dextrose 5%. 1000 milliLiter(s) (100 mL/Hr) IV Continuous <Continuous>  dextrose 50% Injectable 25 Gram(s) IV Push once  dextrose 50% Injectable 12.5 Gram(s) IV Push once  dextrose 50% Injectable 25 Gram(s) IV Push once  glucagon  Injectable 1 milliGRAM(s) IntraMuscular once  insulin glargine Injectable (LANTUS) 5 Unit(s) SubCutaneous at bedtime  insulin lispro (ADMELOG) corrective regimen sliding scale   SubCutaneous three times a day before meals  insulin lispro Injectable (ADMELOG) 1 Unit(s) SubCutaneous three times a day before meals  lisinopril 5 milliGRAM(s) Oral daily  melatonin 5 milliGRAM(s) Oral at bedtime  risperiDONE   Tablet 1 milliGRAM(s) Oral two times a day    MEDICATIONS  (PRN):  acetaminophen   Tablet .. 650 milliGRAM(s) Oral every 4 hours PRN Temp greater or equal to 38.5C (101.3F)  acetaminophen  Suppository .. 650 milliGRAM(s) Rectal every 4 hours PRN Temp greater or equal to 38.5C (101.3F)  benzonatate 100 milliGRAM(s) Oral every 8 hours PRN Cough  guaiFENesin Oral Liquid (Sugar-Free) 200 milliGRAM(s) Oral every 6 hours PRN Cough  LORazepam     Tablet 0.5 milliGRAM(s) Oral two times a day PRN Anxiety  
MEDICATIONS  (STANDING):  ALBUTerol    90 MICROgram(s) HFA Inhaler 2 Puff(s) Inhalation every 6 hours  apixaban 10 milliGRAM(s) Oral every 12 hours  dexAMETHasone  Injectable 6 milliGRAM(s) IV Push daily  dextrose 40% Gel 15 Gram(s) Oral once  dextrose 5%. 1000 milliLiter(s) (50 mL/Hr) IV Continuous <Continuous>  dextrose 5%. 1000 milliLiter(s) (100 mL/Hr) IV Continuous <Continuous>  dextrose 50% Injectable 25 Gram(s) IV Push once  dextrose 50% Injectable 12.5 Gram(s) IV Push once  dextrose 50% Injectable 25 Gram(s) IV Push once  glucagon  Injectable 1 milliGRAM(s) IntraMuscular once  insulin glargine Injectable (LANTUS) 5 Unit(s) SubCutaneous at bedtime  insulin lispro (ADMELOG) corrective regimen sliding scale   SubCutaneous three times a day before meals  insulin lispro Injectable (ADMELOG) 1 Unit(s) SubCutaneous three times a day before meals  lisinopril 5 milliGRAM(s) Oral daily  melatonin 5 milliGRAM(s) Oral at bedtime    MEDICATIONS  (PRN):  acetaminophen   Tablet .. 650 milliGRAM(s) Oral every 4 hours PRN Temp greater or equal to 38.5C (101.3F)  acetaminophen  Suppository .. 650 milliGRAM(s) Rectal every 4 hours PRN Temp greater or equal to 38.5C (101.3F)  benzonatate 100 milliGRAM(s) Oral every 8 hours PRN Cough  guaiFENesin Oral Liquid (Sugar-Free) 200 milliGRAM(s) Oral every 6 hours PRN Cough  LORazepam     Tablet 0.5 milliGRAM(s) Oral two times a day PRN Anxiety

## 2021-06-11 NOTE — ED PROVIDER NOTE - PATIENT PORTAL LINK FT
You can access the FollowMyHealth Patient Portal offered by [facility] by registering at the following website: [email or web address]. By joining [employer]’s FollowMyHealth portal, you will also be able to view your health information using other applications (apps) compatible with our system.

## 2021-06-11 NOTE — ED ADULT NURSE NOTE - CHIEF COMPLAINT QUOTE
patient in no distress was recently DC home states that she is feeling SOB states worried about dying want re-eval, Pox 95 % on RA is + COVID as per MD negative covid

## 2021-06-11 NOTE — BH CONSULTATION LIAISON PROGRESS NOTE - NSBHCONSFOLLOWNEEDS_PSY_ALL_CORE
Needs further psych safety assessment prior to discharge
Needs further psych safety assessment prior to discharge
No psychiatric contraindications to discharge
Needs further psych safety assessment prior to discharge

## 2021-06-11 NOTE — BH CONSULTATION LIAISON PROGRESS NOTE - NSCDBILL_PSY_A_CORE
56007 - Inpatient, moderate complexity
[id number] - Inpatient, high complexity
[id number] - Inpatient, moderate complexity
[id number] - Inpatient, moderate complexity

## 2021-06-11 NOTE — ED PROVIDER NOTE - NSFOLLOWUPINSTRUCTIONS_ED_ALL_ED_FT
Follow up with PCP within 1-2 days   Continue to take your as instructed !!   Take tylenol as needed for fevers/pain  Monitor spo2 at home, remain above 95     - Follow up with your doctor within 2-3 days.   - Take Tylenol (Acetaminophen) 650mg or Motrin (Ibuprofen/Advil) 600mg every 6 hours as needed for pain.   - Stay well hydrated.   - Stay at home until you receive test results.   - See attached COVID-19 instructions.   - Return to the ED for any new or worsening symptoms.     Viral Respiratory Infection    A viral respiratory infection is an illness that affects parts of the body used for breathing, like the lungs, nose, and throat. It is caused by a germ called a virus. Symptoms can include runny nose, coughing, sneezing, fatigue, body aches, sore throat, fever, or headache. Over the counter medicine can be used to manage the symptoms but the infection typically goes away on its own in 5 to 10 days.     SEEK IMMEDIATE MEDICAL CARE IF YOU HAVE ANY OF THE FOLLOWING SYMPTOMS: shortness of breath, chest pain, fever over 10 days, or lightheadedness/dizziness.    Deep Vein Thrombosis    WHAT YOU NEED TO KNOW:    Deep vein thrombosis (DVT) is a blood clot that forms in a deep vein of the body. The DVT can break into smaller pieces and travel to your lungs and cause a blockage called a pulmonary embolism. A PE can become life-threatening. It is important to go to all follow-up appointments and to take blood thinners as directed. This is especially important if you were discharged home from the emergency department.Thrombus and Embolus         DISCHARGE INSTRUCTIONS:    Call your local emergency number (911 in the [place]) if:     You feel lightheaded, short of breath, and have chest pain.      You cough up blood.    Call your doctor if:     Your arm or leg feels warm, tender, and painful. It may look swollen and red.      You have questions or concerns about your condition or care.    Medicines:     Blood thinners help prevent blood clots. Examples of blood thinners include heparin and warfarin. Clots can cause strokes, heart attacks, and death. The following are general safety guidelines to follow while you are taking a blood thinner:  Watch for bleeding and bruising while you take blood thinners. Watch for bleeding from your gums or nose. Watch for blood in your urine and bowel movements. Use a soft washcloth on your skin, and a soft toothbrush to brush your teeth. This can keep your skin and gums from bleeding. If you shave, use an electric shaver. Do not play contact sports.       Tell your dentist and other healthcare providers that you take anticoagulants. Wear a bracelet or necklace that says you take this medicine.       Do not start or stop any medicines unless your healthcare provider tells you to. Many medicines cannot be used with blood thinners.       Tell your healthcare provider right away if you forget to take the medicine, or if you take too much.      Warfarin is a blood thinner that you may need to take. The following are things you should be aware of if you take warfarin:   Foods and medicines can affect the amount of warfarin in your blood. Do not make major changes to your diet while you take warfarin. Warfarin works best when you eat about the same amount of vitamin K every day. Vitamin K is found in green leafy vegetables and certain other foods. Ask for more information about what to eat when you are taking warfarin.      You will need to see your healthcare provider for follow-up visits when you are on warfarin. You will need regular blood tests. These tests are used to decide how much medicine you need.       Take your medicine as directed. Contact your healthcare provider if you think your medicine is not helping or if you have side effects. Tell him or her if you are allergic to any medicine. Keep a list of the medicines, vitamins, and herbs you take. Include the amounts, and when and why you take them. Bring the list or the pill bottles to follow-up visits. Carry your medicine list with you in case of an emergency.    Manage a DVT:     Wear pressure stockings as directed. The stockings put pressure on your legs. This improves blood flow and helps prevent clots. Wear the stockings during the day. Do not wear them when you sleep.Pressure Stockings            Elevate your legs above the level of your heart. Elevate your legs when you sit or lie down, as often as you can. This will help decrease swelling and pain. Prop your legs on pillows or blankets to keep them elevated comfortably. Elevate Limb         Prevent a DVT:     Exercise regularly to help increase your blood flow. Walking is a good low-impact exercise. Talk to your healthcare provider about the best exercise plan for you. Walking for Exercise           Change your body position or move around often. Move and stretch in your seat several times each hour if you travel by car or work at a desk. In an airplane, get up and walk every hour. Move your legs by tightening and releasing your leg muscles while sitting. You can move your legs while sitting by raising and lowering your heels. Keep your toes on the floor while you do this. You can also raise and lower your toes while keeping your heels on the floor.DVT Prevention Heel Raise     DVT Prevention Toe Raise           Maintain a healthy weight. Ask your healthcare provider how much you should weigh. Ask him or her to help you create a weight loss plan if you are overweight.       Do not smoke. Nicotine and other chemicals in cigarettes and cigars can damage blood vessels and make it more difficult to manage your DVT. Ask your healthcare provider for information if you currently smoke and need help to quit. E-cigarettes or smokeless tobacco still contain nicotine. Talk to your healthcare provider before you use these products.      Ask about birth control if you are a woman who takes the pill. A birth control pill increases the risk for PE in certain women. The risk is higher if you are also older than 35, smoke cigarettes, or have a blood clotting disorder. Talk to your healthcare provider about other ways to prevent pregnancy, such as a cervical cap or intrauterine device (IUD).    Follow up with your doctor or specialist as directed: You may need to come in regularly for scans to check for blood clots. Your blood may checked to see how long it takes to clot. Your doctor or specialist will tell you if you need to have this test and how often to have it. Write down your questions so you remember to ask them during your visits.

## 2021-06-12 ENCOUNTER — EMERGENCY (EMERGENCY)
Facility: HOSPITAL | Age: 36
LOS: 1 days | Discharge: HOME | End: 2021-06-12
Attending: EMERGENCY MEDICINE
Payer: MEDICAID

## 2021-06-12 VITALS
RESPIRATION RATE: 17 BRPM | HEIGHT: 70 IN | TEMPERATURE: 99 F | SYSTOLIC BLOOD PRESSURE: 134 MMHG | HEART RATE: 104 BPM | DIASTOLIC BLOOD PRESSURE: 91 MMHG | OXYGEN SATURATION: 98 %

## 2021-06-12 VITALS — HEART RATE: 101 BPM

## 2021-06-12 PROBLEM — J96.01 ACUTE RESPIRATORY FAILURE WITH HYPOXIA: Chronic | Status: ACTIVE | Noted: 2021-06-03

## 2021-06-12 LAB
ALBUMIN SERPL ELPH-MCNC: 3.8 G/DL — SIGNIFICANT CHANGE UP (ref 3.3–5.2)
ALP SERPL-CCNC: 76 U/L — SIGNIFICANT CHANGE UP (ref 40–120)
ALT FLD-CCNC: 371 U/L — HIGH
AMPHET UR-MCNC: NEGATIVE — SIGNIFICANT CHANGE UP
ANION GAP SERPL CALC-SCNC: 12 MMOL/L — SIGNIFICANT CHANGE UP (ref 5–17)
AST SERPL-CCNC: 102 U/L — HIGH
BARBITURATES UR SCN-MCNC: NEGATIVE — SIGNIFICANT CHANGE UP
BASOPHILS # BLD AUTO: 0.03 K/UL — SIGNIFICANT CHANGE UP (ref 0–0.2)
BASOPHILS NFR BLD AUTO: 0.4 % — SIGNIFICANT CHANGE UP (ref 0–2)
BENZODIAZ UR-MCNC: NEGATIVE — SIGNIFICANT CHANGE UP
BILIRUB SERPL-MCNC: 0.7 MG/DL — SIGNIFICANT CHANGE UP (ref 0.4–2)
BUN SERPL-MCNC: 14.5 MG/DL — SIGNIFICANT CHANGE UP (ref 8–20)
CALCIUM SERPL-MCNC: 9.2 MG/DL — SIGNIFICANT CHANGE UP (ref 8.6–10.2)
CHLORIDE SERPL-SCNC: 98 MMOL/L — SIGNIFICANT CHANGE UP (ref 98–107)
CO2 SERPL-SCNC: 24 MMOL/L — SIGNIFICANT CHANGE UP (ref 22–29)
COCAINE METAB.OTHER UR-MCNC: NEGATIVE — SIGNIFICANT CHANGE UP
CREAT SERPL-MCNC: 0.94 MG/DL — SIGNIFICANT CHANGE UP (ref 0.5–1.3)
EOSINOPHIL # BLD AUTO: 0.07 K/UL — SIGNIFICANT CHANGE UP (ref 0–0.5)
EOSINOPHIL NFR BLD AUTO: 0.9 % — SIGNIFICANT CHANGE UP (ref 0–6)
ETHANOL SERPL-MCNC: <10 MG/DL — SIGNIFICANT CHANGE UP (ref 0–9)
GLUCOSE SERPL-MCNC: 107 MG/DL — HIGH (ref 70–99)
HCT VFR BLD CALC: 38.8 % — LOW (ref 39–50)
HGB BLD-MCNC: 13.1 G/DL — SIGNIFICANT CHANGE UP (ref 13–17)
IMM GRANULOCYTES NFR BLD AUTO: 0.5 % — SIGNIFICANT CHANGE UP (ref 0–1.5)
LYMPHOCYTES # BLD AUTO: 2.73 K/UL — SIGNIFICANT CHANGE UP (ref 1–3.3)
LYMPHOCYTES # BLD AUTO: 34 % — SIGNIFICANT CHANGE UP (ref 13–44)
MCHC RBC-ENTMCNC: 31.1 PG — SIGNIFICANT CHANGE UP (ref 27–34)
MCHC RBC-ENTMCNC: 33.8 GM/DL — SIGNIFICANT CHANGE UP (ref 32–36)
MCV RBC AUTO: 92.2 FL — SIGNIFICANT CHANGE UP (ref 80–100)
METHADONE UR-MCNC: NEGATIVE — SIGNIFICANT CHANGE UP
MONOCYTES # BLD AUTO: 0.73 K/UL — SIGNIFICANT CHANGE UP (ref 0–0.9)
MONOCYTES NFR BLD AUTO: 9.1 % — SIGNIFICANT CHANGE UP (ref 2–14)
NEUTROPHILS # BLD AUTO: 4.42 K/UL — SIGNIFICANT CHANGE UP (ref 1.8–7.4)
NEUTROPHILS NFR BLD AUTO: 55.1 % — SIGNIFICANT CHANGE UP (ref 43–77)
OPIATES UR-MCNC: NEGATIVE — SIGNIFICANT CHANGE UP
PCP SPEC-MCNC: SIGNIFICANT CHANGE UP
PCP UR-MCNC: NEGATIVE — SIGNIFICANT CHANGE UP
PLATELET # BLD AUTO: 323 K/UL — SIGNIFICANT CHANGE UP (ref 150–400)
POTASSIUM SERPL-MCNC: 4.5 MMOL/L — SIGNIFICANT CHANGE UP (ref 3.5–5.3)
POTASSIUM SERPL-SCNC: 4.5 MMOL/L — SIGNIFICANT CHANGE UP (ref 3.5–5.3)
PROT SERPL-MCNC: 6.9 G/DL — SIGNIFICANT CHANGE UP (ref 6.6–8.7)
RBC # BLD: 4.21 M/UL — SIGNIFICANT CHANGE UP (ref 4.2–5.8)
RBC # FLD: 12.9 % — SIGNIFICANT CHANGE UP (ref 10.3–14.5)
SODIUM SERPL-SCNC: 134 MMOL/L — LOW (ref 135–145)
THC UR QL: NEGATIVE — SIGNIFICANT CHANGE UP
WBC # BLD: 8.02 K/UL — SIGNIFICANT CHANGE UP (ref 3.8–10.5)
WBC # FLD AUTO: 8.02 K/UL — SIGNIFICANT CHANGE UP (ref 3.8–10.5)

## 2021-06-12 PROCEDURE — 99284 EMERGENCY DEPT VISIT MOD MDM: CPT

## 2021-06-12 PROCEDURE — 80307 DRUG TEST PRSMV CHEM ANLYZR: CPT

## 2021-06-12 PROCEDURE — 36415 COLL VENOUS BLD VENIPUNCTURE: CPT

## 2021-06-12 PROCEDURE — 85025 COMPLETE CBC W/AUTO DIFF WBC: CPT

## 2021-06-12 PROCEDURE — 80053 COMPREHEN METABOLIC PANEL: CPT

## 2021-06-12 PROCEDURE — 94640 AIRWAY INHALATION TREATMENT: CPT

## 2021-06-12 RX ORDER — ALBUTEROL 90 UG/1
1 AEROSOL, METERED ORAL ONCE
Refills: 0 | Status: COMPLETED | OUTPATIENT
Start: 2021-06-12 | End: 2021-06-12

## 2021-06-12 RX ORDER — RISPERIDONE 4 MG/1
1 TABLET ORAL ONCE
Refills: 0 | Status: COMPLETED | OUTPATIENT
Start: 2021-06-12 | End: 2021-06-12

## 2021-06-12 RX ORDER — APIXABAN 2.5 MG/1
10 TABLET, FILM COATED ORAL ONCE
Refills: 0 | Status: COMPLETED | OUTPATIENT
Start: 2021-06-12 | End: 2021-06-12

## 2021-06-12 RX ORDER — LISINOPRIL 2.5 MG/1
5 TABLET ORAL ONCE
Refills: 0 | Status: COMPLETED | OUTPATIENT
Start: 2021-06-12 | End: 2021-06-12

## 2021-06-12 RX ADMIN — RISPERIDONE 1 MILLIGRAM(S): 4 TABLET ORAL at 08:51

## 2021-06-12 RX ADMIN — Medication 2 MILLIGRAM(S): at 02:25

## 2021-06-12 RX ADMIN — ALBUTEROL 1 PUFF(S): 90 AEROSOL, METERED ORAL at 08:36

## 2021-06-12 RX ADMIN — LISINOPRIL 5 MILLIGRAM(S): 2.5 TABLET ORAL at 08:36

## 2021-06-12 RX ADMIN — APIXABAN 10 MILLIGRAM(S): 2.5 TABLET, FILM COATED ORAL at 08:51

## 2021-06-12 NOTE — ED PROVIDER NOTE - PATIENT PORTAL LINK FT
You can access the FollowMyHealth Patient Portal offered by [facility] by registering at the following website: [email or web address]. By joining [employer]’s FollowMyHealth portal, you will also be able to view your health information using other applications (apps) compatible with our system.

## 2021-06-12 NOTE — ED ADULT NURSE REASSESSMENT NOTE - NS ED NURSE REASSESS COMMENT FT1
Assumed care of pt at 0730, patient is resting in bed and waiting to be seen by psych. Pt is calm and cooperative. NAD noted.

## 2021-06-12 NOTE — ED ADULT NURSE NOTE - NSIMPLEMENTINTERV_GEN_ALL_ED
Implemented All Universal Safety Interventions:  [place] to call system. Call bell, personal items and telephone within reach. Instruct patient to call for assistance. Room bathroom lighting operational. Non-slip footwear when patient is off stretcher. Physically safe environment: no spills, clutter or unnecessary equipment. Stretcher in lowest position, wheels locked, appropriate side rails in place.

## 2021-06-12 NOTE — ED PROVIDER NOTE - OBJECTIVE STATEMENT
34 yo M s/p discharge from ED a few hours ago presents to ED via EMS c/o having no pulse and felt like he was having a heart attack.  Pt s/p d/c from [facility] on 6/10 for COVID pneumonia and PE and currently on Eliquis.  Pt states he was laying in his stairwell because he was unable to make it upstairs after being dc'd last evening.  Pt admits to psychiatric hx for which he is prescribed Seroquel and states he took last dose last evening.  Pt seems very paranoid on arrival in no acute distress

## 2021-06-12 NOTE — ED PROVIDER NOTE - PROGRESS NOTE DETAILS
AJM: pt calm, cooperative. no longer wishes to see BH. family at bedside who states they will take pt home with them where he does not need to use stairs. return precautions discussed. No hypoxia or resp distress here. No Si or hallucinations. stable for dc

## 2021-06-12 NOTE — ED ADULT NURSE NOTE - NSFALLRSKASSESSTYPE_ED_ALL_ED
Tc to Dr Wing Sanchez office. There was no answer. A message was left inquiring if Dr Christa Catalan treated Hyperdontia. Dr Wing Sanchez office called back and stated they did treat for Hyperdontia. The pt's name and phone number was given to the registrar so she could call the parent and schedule an appt for the pt to see Dr Christa Catalan.  Santiago Walden RN Initial (On Arrival)

## 2021-06-12 NOTE — ED ADULT NURSE NOTE - CHIEF COMPLAINT QUOTE
"im dying, I have no pulse and im going to stop breathing any second I need help". pt d/c from [facility] this evening for URI, + anxiety hx + marijuana use

## 2021-06-12 NOTE — ED ADULT NURSE NOTE - OBJECTIVE STATEMENT
Awake and alert; skin warm and dry.  Pt is s/p discharge [facility] few hours ago presents to ED via EMS c/o having no pulse and felt like he was having a heart attack.  Pt s/p d/c from [facility] on 6/10 for COVID pneumonia and PE and currently on Eliquis.  Pt states he was laying in his stairwell because he was unable to make it upstairs after being dc'd last evening.   Pt seems very paranoid on arrival in no acute distress; upon my exam patient cooperative and less anxious.  States he feels a little better.  Pt OOB to bathroom- gait steady without any distress noted.  Labs drawn; urine specimen completed.  Medicated as ordered.  Awaiting results.

## 2021-06-12 NOTE — ED ADULT TRIAGE NOTE - CHIEF COMPLAINT QUOTE
"im dying, I have no pulse and im going to stop breathing any second I need help". pt d/c from [facility] this evening for URI, + anxiety hx + marijuana use

## 2021-06-17 ENCOUNTER — APPOINTMENT (OUTPATIENT)
Dept: INTERNAL MEDICINE | Facility: CLINIC | Age: 36
End: 2021-06-17
Payer: MEDICAID

## 2021-06-17 VITALS
WEIGHT: 286 LBS | SYSTOLIC BLOOD PRESSURE: 129 MMHG | RESPIRATION RATE: 14 BRPM | HEIGHT: 70.5 IN | TEMPERATURE: 97.1 F | BODY MASS INDEX: 40.49 KG/M2 | HEART RATE: 92 BPM | DIASTOLIC BLOOD PRESSURE: 81 MMHG | OXYGEN SATURATION: 93 %

## 2021-06-17 PROCEDURE — 99406 BEHAV CHNG SMOKING 3-10 MIN: CPT

## 2021-06-17 PROCEDURE — 99204 OFFICE O/P NEW MOD 45 MIN: CPT

## 2021-06-17 RX ORDER — RISPERIDONE 1 MG/1
1 TABLET, FILM COATED ORAL
Qty: 60 | Refills: 2 | Status: ACTIVE | COMMUNITY
Start: 1900-01-01 | End: 1900-01-01

## 2021-06-17 RX ORDER — APIXABAN 5 MG/1
5 TABLET, FILM COATED ORAL
Qty: 60 | Refills: 3 | Status: ACTIVE | COMMUNITY
Start: 1900-01-01 | End: 1900-01-01

## 2021-06-17 RX ORDER — LISINOPRIL 5 MG/1
5 TABLET ORAL DAILY
Qty: 30 | Refills: 2 | Status: ACTIVE | COMMUNITY
Start: 1900-01-01 | End: 1900-01-01

## 2021-06-22 PROCEDURE — 80076 HEPATIC FUNCTION PANEL: CPT

## 2021-06-22 PROCEDURE — 93970 EXTREMITY STUDY: CPT

## 2021-06-22 PROCEDURE — 83880 ASSAY OF NATRIURETIC PEPTIDE: CPT

## 2021-06-22 PROCEDURE — 83615 LACTATE (LD) (LDH) ENZYME: CPT

## 2021-06-22 PROCEDURE — 83735 ASSAY OF MAGNESIUM: CPT

## 2021-06-22 PROCEDURE — U0005: CPT

## 2021-06-22 PROCEDURE — 85027 COMPLETE CBC AUTOMATED: CPT

## 2021-06-22 PROCEDURE — 71275 CT ANGIOGRAPHY CHEST: CPT

## 2021-06-22 PROCEDURE — 80048 BASIC METABOLIC PNL TOTAL CA: CPT

## 2021-06-22 PROCEDURE — 80053 COMPREHEN METABOLIC PANEL: CPT

## 2021-06-22 PROCEDURE — 93306 TTE W/DOPPLER COMPLETE: CPT

## 2021-06-22 PROCEDURE — 99285 EMERGENCY DEPT VISIT HI MDM: CPT | Mod: 25

## 2021-06-22 PROCEDURE — 97163 PT EVAL HIGH COMPLEX 45 MIN: CPT

## 2021-06-22 PROCEDURE — 83036 HEMOGLOBIN GLYCOSYLATED A1C: CPT

## 2021-06-22 PROCEDURE — 85610 PROTHROMBIN TIME: CPT

## 2021-06-22 PROCEDURE — 85730 THROMBOPLASTIN TIME PARTIAL: CPT

## 2021-06-22 PROCEDURE — 82728 ASSAY OF FERRITIN: CPT

## 2021-06-22 PROCEDURE — 84484 ASSAY OF TROPONIN QUANT: CPT

## 2021-06-22 PROCEDURE — 36415 COLL VENOUS BLD VENIPUNCTURE: CPT

## 2021-06-22 PROCEDURE — 82962 GLUCOSE BLOOD TEST: CPT

## 2021-06-22 PROCEDURE — 85379 FIBRIN DEGRADATION QUANT: CPT

## 2021-06-22 PROCEDURE — 71045 X-RAY EXAM CHEST 1 VIEW: CPT

## 2021-06-22 PROCEDURE — 82565 ASSAY OF CREATININE: CPT

## 2021-06-22 PROCEDURE — 94640 AIRWAY INHALATION TREATMENT: CPT

## 2021-06-22 PROCEDURE — U0003: CPT

## 2021-06-22 PROCEDURE — 84145 PROCALCITONIN (PCT): CPT

## 2021-06-22 PROCEDURE — 96374 THER/PROPH/DIAG INJ IV PUSH: CPT

## 2021-06-22 PROCEDURE — 93005 ELECTROCARDIOGRAM TRACING: CPT

## 2021-06-22 PROCEDURE — 86140 C-REACTIVE PROTEIN: CPT

## 2021-06-22 PROCEDURE — 86769 SARS-COV-2 COVID-19 ANTIBODY: CPT

## 2021-06-22 PROCEDURE — 85025 COMPLETE CBC W/AUTO DIFF WBC: CPT

## 2021-06-23 ENCOUNTER — APPOINTMENT (OUTPATIENT)
Dept: PULMONOLOGY | Facility: CLINIC | Age: 36
End: 2021-06-23
Payer: MEDICAID

## 2021-06-23 VITALS
OXYGEN SATURATION: 94 % | DIASTOLIC BLOOD PRESSURE: 93 MMHG | WEIGHT: 286 LBS | SYSTOLIC BLOOD PRESSURE: 138 MMHG | TEMPERATURE: 97.3 F | HEIGHT: 70.5 IN | RESPIRATION RATE: 12 BRPM | BODY MASS INDEX: 40.49 KG/M2 | HEART RATE: 92 BPM

## 2021-06-23 DIAGNOSIS — G47.8 OTHER SLEEP DISORDERS: ICD-10-CM

## 2021-06-23 PROCEDURE — 95012 NITRIC OXIDE EXP GAS DETER: CPT

## 2021-06-23 PROCEDURE — 94060 EVALUATION OF WHEEZING: CPT

## 2021-06-23 PROCEDURE — 94729 DIFFUSING CAPACITY: CPT

## 2021-06-23 PROCEDURE — 94726 PLETHYSMOGRAPHY LUNG VOLUMES: CPT

## 2021-06-23 PROCEDURE — 99244 OFF/OP CNSLTJ NEW/EST MOD 40: CPT | Mod: 25

## 2021-06-23 RX ORDER — BUDESONIDE, GLYCOPYRROLATE, AND FORMOTEROL FUMARATE 160; 9; 4.8 UG/1; UG/1; UG/1
160-9-4.8 AEROSOL, METERED RESPIRATORY (INHALATION) TWICE DAILY
Qty: 1 | Refills: 3 | Status: ACTIVE | COMMUNITY
Start: 2021-06-23 | End: 1900-01-01

## 2021-06-23 NOTE — HISTORY OF PRESENT ILLNESS
[Never] : never [TextBox_4] : Patient is a 35-year-old male with past medical history significant for anxiety who was recently admitted to the hospital for a pulmonary embolism and COVID-19 pneumonia and subsequently discharged and referred today for follow-up.  The patient complains of cough shortness of breath and dyspnea on exertion.  He is extremely anxious.  He denies fevers chills chest pain weight loss or hemoptysis

## 2021-06-23 NOTE — ASSESSMENT
[FreeTextEntry1] : In summary the patient is a 35-year-old male with hypertension status post COVID-19 pneumonia, s/p pulmonary emboli currently on Eliquis and lisinopril who presents for pulmonary consult.  The patient's physical exam is within normal limits.\par \par The patient underwent a pulmonary function test which revealed severe obstructive and restrictive lung disease.\par \par FeNO 12 PPB\par Patient is now started on breast 3 and instructed to follow-up in 2 weeks

## 2021-06-23 NOTE — REASON FOR VISIT
[Consultation] : a consultation [Cough] : cough [Pulmonary Embolism] : pulmonary embolism [Shortness of Breath] : shortness of breath [Wheezing] : wheezing

## 2021-07-07 ENCOUNTER — APPOINTMENT (OUTPATIENT)
Dept: PULMONOLOGY | Facility: CLINIC | Age: 36
End: 2021-07-07
Payer: MEDICAID

## 2021-07-07 VITALS
DIASTOLIC BLOOD PRESSURE: 80 MMHG | HEIGHT: 70.5 IN | HEART RATE: 92 BPM | SYSTOLIC BLOOD PRESSURE: 120 MMHG | TEMPERATURE: 98 F | RESPIRATION RATE: 12 BRPM | OXYGEN SATURATION: 96 %

## 2021-07-07 PROCEDURE — 99214 OFFICE O/P EST MOD 30 MIN: CPT

## 2021-07-07 NOTE — ASSESSMENT
[FreeTextEntry1] : In summary the patient is a 35-year-old male with hypertension status post COVID-19 pneumonia, s/p pulmonary emboli currently on Eliquis and lisinopril who presents for follow-up.  The patient's physical exam is significant for improved air entry bilaterally.\par \par I instructed patient to remain on his current medications.  A home sleep study has been ordered and the patient is instructed to follow-up in 1 month

## 2021-07-07 NOTE — HISTORY OF PRESENT ILLNESS
[Never] : never [TextBox_4] : Patient is a 35-year-old morbidly obese male with past medical history significant for anxiety, DVT, hypertension sleep apnea history of COVID-19 pneumonia status post pulmonary emboli on anticoagulation who presents for follow-up.  Patient states that his cough shortness of breath and dyspnea on exertion have improved.  He currently denies fevers chills chest pain weight loss hemoptysis

## 2021-07-15 ENCOUNTER — APPOINTMENT (OUTPATIENT)
Dept: INTERNAL MEDICINE | Facility: CLINIC | Age: 36
End: 2021-07-15
Payer: MEDICAID

## 2021-07-15 VITALS — TEMPERATURE: 97.5 F | HEART RATE: 85 BPM | OXYGEN SATURATION: 97 % | HEIGHT: 70.5 IN | RESPIRATION RATE: 12 BRPM

## 2021-07-15 VITALS — SYSTOLIC BLOOD PRESSURE: 128 MMHG | DIASTOLIC BLOOD PRESSURE: 84 MMHG

## 2021-07-15 DIAGNOSIS — Z00.00 ENCOUNTER FOR GENERAL ADULT MEDICAL EXAMINATION W/OUT ABNORMAL FINDINGS: ICD-10-CM

## 2021-07-15 PROCEDURE — 99395 PREV VISIT EST AGE 18-39: CPT

## 2021-07-22 ENCOUNTER — APPOINTMENT (OUTPATIENT)
Dept: INTERNAL MEDICINE | Facility: CLINIC | Age: 36
End: 2021-07-22
Payer: MEDICAID

## 2021-07-22 DIAGNOSIS — R79.89 OTHER SPECIFIED ABNORMAL FINDINGS OF BLOOD CHEMISTRY: ICD-10-CM

## 2021-07-22 PROCEDURE — 99214 OFFICE O/P EST MOD 30 MIN: CPT | Mod: 95

## 2021-07-25 ENCOUNTER — TRANSCRIPTION ENCOUNTER (OUTPATIENT)
Age: 36
End: 2021-07-25

## 2021-07-25 PROBLEM — R79.89 LOW VITAMIN D LEVEL: Status: ACTIVE | Noted: 2021-07-25

## 2021-07-25 NOTE — HISTORY OF PRESENT ILLNESS
[de-identified] : Omar Guzman  is a 36 yo male, accompanied by his mother, presents today for post hospitalization follow up visit / initial evaluation\par Pt was hospitalized for 12 days at [facility] for COVID infection, complicated by DVT/PE, anxiety \par Pt states that while he was hospitalized he had paranoid thoughts about the nurses from the hospital wanting to harm him\par Says he was placed on 1:1 during his hospitalization because he became very anxious and psychiatry evaluated him and he was told he had anxiety and PTSD.\par He reports prior to being hospitalized he was drinking 1 bottle of wine daily and vaping 1/2 cartage daily. 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 Followed  the psychiatrist for a couple of months and reports has been fine since.\par  Denies CP, HA, numbness/tingling, suicidal or homicidal thoughts\par He's able to ambulate 400 steps without any SOB /  distress at this time.

## 2021-07-25 NOTE — ASSESSMENT
[FreeTextEntry1] : \par \par Lab results reviewed and discussed with patient\par \par low vit D\par start vit D 2000iu daily\par \par slightly elevated ALT-pt reports that he had gone out and had some drinks the night before\par we'll repeat LFT's in 4 weeks\par \par COVID ab is positive\par \par Anxiety\par cont  Risperidone 1 mg \par follows with therapist and psych\par \par HTN\par discussed importance of following a low salt diet, weight loss, exercise\par cont Lisinopril 5 mg daily\par \par s/p COVID pneumonia,  DVT, Pulmonary Embolus \par cont  Eliquis 5 mg BID\par cont Breztri inhaler\par follows with pulmonary\par \par \par

## 2021-07-25 NOTE — ASSESSMENT
[FreeTextEntry1] : \par Physical\par blood work ordered\par \par HTN\par cont Lisinopril 5mg daily\par discussed importance of following a low salt diet, weight loss, exercise\par \par  s/p COVID pneumonia, DVT, PE\par saw pulmonary\par cont Eliquis 5mg BID\par cont breztri inhaler\par \par Recently diagnosed anxiety\par cont Rieperidone 1mg q12hrs\par sees therapist weekly\par has balta't with psych tomorrow\par \par follow up in one week for lab results\par

## 2021-07-25 NOTE — PHYSICAL EXAM
[No Acute Distress] : no acute distress [Well Nourished] : well nourished [Well Developed] : well developed [Normal Sclera/Conjunctiva] : normal sclera/conjunctiva [EOMI] : extraocular movements intact [Normal Outer Ear/Nose] : the outer ears and nose were normal in appearance [Normal Oropharynx] : the oropharynx was normal [No Lymphadenopathy] : no lymphadenopathy [Supple] : supple [No Respiratory Distress] : no respiratory distress  [No Accessory Muscle Use] : no accessory muscle use [Clear to Auscultation] : lungs were clear to auscultation bilaterally [Normal Rate] : normal rate  [Regular Rhythm] : with a regular rhythm [Normal S1, S2] : normal S1 and S2 [Pedal Pulses Present] : the pedal pulses are present [No Edema] : there was no peripheral edema [No Extremity Clubbing/Cyanosis] : no extremity clubbing/cyanosis [Soft] : abdomen soft [Non Tender] : non-tender [Normal Bowel Sounds] : normal bowel sounds [Normal Posterior Cervical Nodes] : no posterior cervical lymphadenopathy [Normal Anterior Cervical Nodes] : no anterior cervical lymphadenopathy [No CVA Tenderness] : no CVA  tenderness [No Spinal Tenderness] : no spinal tenderness [No Joint Swelling] : no joint swelling [No Rash] : no rash [Coordination Grossly Intact] : coordination grossly intact [Normal Gait] : normal gait [Speech Grossly Normal] : speech grossly normal [Normal Affect] : the affect was normal [Alert and Oriented x3] : oriented to person, place, and time [de-identified] : right lateral tongue canker sore

## 2021-07-25 NOTE — REVIEW OF SYSTEMS
Left message for patient to call back regarding pre-visit planning. Please transfer call to [place] at [phone]   [Negative] : Heme/Lymph [de-identified] : see hpi

## 2021-07-25 NOTE — REVIEW OF SYSTEMS
[Nasal Discharge] : nasal discharge [Cough] : cough [Dyspnea on Exertion] : dyspnea on exertion [Anxiety] : anxiety [Negative] : Heme/Lymph [FreeTextEntry4] : sore on right side of tongue [FreeTextEntry6] : productive cough, [FreeTextEntry9] : b/l LE leg sore [de-identified] : paranoid thoughts

## 2021-07-25 NOTE — PHYSICAL EXAM
[No Acute Distress] : no acute distress [Well Nourished] : well nourished [Well Developed] : well developed [Normal Sclera/Conjunctiva] : normal sclera/conjunctiva [EOMI] : extraocular movements intact [Normal Oropharynx] : the oropharynx was normal [No Lymphadenopathy] : no lymphadenopathy [Supple] : supple [No Respiratory Distress] : no respiratory distress  [Normal Rate] : normal rate  [Regular Rhythm] : with a regular rhythm [Normal S1, S2] : normal S1 and S2 [Pedal Pulses Present] : the pedal pulses are present [No Edema] : there was no peripheral edema [No Extremity Clubbing/Cyanosis] : no extremity clubbing/cyanosis [Soft] : abdomen soft [Non Tender] : non-tender [Normal Bowel Sounds] : normal bowel sounds [Normal Posterior Cervical Nodes] : no posterior cervical lymphadenopathy [Normal Anterior Cervical Nodes] : no anterior cervical lymphadenopathy [No Rash] : no rash [Coordination Grossly Intact] : coordination grossly intact [Normal Gait] : normal gait [Normal Affect] : the affect was normal [Normal Outer Ear/Nose] : the outer ears and nose were normal in appearance [No CVA Tenderness] : no CVA  tenderness [No Joint Swelling] : no joint swelling [No Spinal Tenderness] : no spinal tenderness [de-identified] : right lateral tongue canker sore [de-identified] : diminished at bases [de-identified] : anxiety- see hpi

## 2021-07-25 NOTE — HEALTH RISK ASSESSMENT
[Yes] : Yes [Alone] : lives alone [Employed] : employed [Single] : single [] : No [de-identified] : occasionally

## 2021-07-25 NOTE — ASSESSMENT
[FreeTextEntry1] : \par \par Anxiety\par psychiatry referral\par Renewed Risperidone 1 mg \par \par Pt is accompanied by his mom who is also my pt. As per mom pt was fine prior to his hospitalization and she believes he was anxious during his hospitalization because he was scared. Also mom thinks it might have been the medications they were giving him at the hospital that made him get paranoid\par \par HTN\par discussed importance of following a low salt diet, weight loss, exercise\par cont Lisinopril 5 mg daily-renewed today\par \par Pulmonary Embolus \par cont  Eliquis 5 mg BID-renewed today\par f/u with pulmonary\par \par \par Follow- Up 1 month \par \par

## 2021-07-25 NOTE — HISTORY OF PRESENT ILLNESS
[Home] : at home, [unfilled] , at the time of the visit. [Medical Office: ([facility])___] : at the medical office located in  [Verbal consent obtained from patient] : the patient, [unfilled] [de-identified] : \par Mr. JANIE HANEY is a 35 year old male, with hx of HTN, anxiety,  COVID pneumonia, DVT/PE, seen in telemedicine for follow up visit to discuss lab results\par He is doing well. \par Denies SOB, chest pain, abdominal pain, N/V/D, leg swelling, headache, dizziness \par Offers no complaints\par

## 2021-07-25 NOTE — HISTORY OF PRESENT ILLNESS
[de-identified] : Omar Sylvester 34 yo male, with hx of HTN, s/p COVID pneumonia, DVT, PE, recently diagnosed anxiety ( while he was hospitalized for COVID ) resents for annual physical\par Pt states he is doing well\par He is seeing therapist weekly and has balta't with psychiatrist tomorrow\par Followed up with pulmonary last month. \par Reports compliance with taking his meds daily\par rior to being hospitalized was drinking 1 bottle of wine daily and vaping 1/2 cartage daily. Also gained about 60 lbs over the past year.\par Denies CP, SOB, HA, numbness/tingling, suicidal or homicidal thoughts\par

## 2021-07-25 NOTE — COUNSELING
[Risk of tobacco use and health benefits of smoking cessation discussed] : Risk of tobacco use and health benefits of smoking cessation discussed [Use of nicotine replacement therapies and other medications discussed] : Use of nicotine replacement therapies and other medications discussed [Strategies to reduce or eliminate alcohol use discussed] : Strategies to reduce or eliminate alcohol use discussed

## 2021-07-30 LAB
25(OH)D3 SERPL-MCNC: 21.7 NG/ML
ALBUMIN SERPL ELPH-MCNC: 4.9 G/DL
ALP BLD-CCNC: 85 U/L
ALT SERPL-CCNC: 49 U/L
ANION GAP SERPL CALC-SCNC: 16 MMOL/L
APPEARANCE: CLEAR
AST SERPL-CCNC: 32 U/L
BASOPHILS # BLD AUTO: 0.07 K/UL
BASOPHILS NFR BLD AUTO: 0.9 %
BILIRUB SERPL-MCNC: 0.6 MG/DL
BILIRUBIN URINE: NEGATIVE
BLOOD URINE: NEGATIVE
BUN SERPL-MCNC: 10 MG/DL
CALCIUM SERPL-MCNC: 10.4 MG/DL
CHLORIDE SERPL-SCNC: 102 MMOL/L
CHOLEST SERPL-MCNC: 175 MG/DL
CO2 SERPL-SCNC: 21 MMOL/L
COLOR: YELLOW
COVID-19 NUCLEOCAPSID  GAM ANTIBODY INTERPRETATION: POSITIVE
COVID-19 SPIKE DOMAIN ANTIBODY INTERPRETATION: POSITIVE
CREAT SERPL-MCNC: 0.75 MG/DL
EOSINOPHIL # BLD AUTO: 0.16 K/UL
EOSINOPHIL NFR BLD AUTO: 2 %
ESTIMATED AVERAGE GLUCOSE: 108 MG/DL
GLUCOSE QUALITATIVE U: NEGATIVE
GLUCOSE SERPL-MCNC: 71 MG/DL
HBA1C MFR BLD HPLC: 5.4 %
HCT VFR BLD CALC: 41.7 %
HDLC SERPL-MCNC: 63 MG/DL
HGB BLD-MCNC: 14 G/DL
IMM GRANULOCYTES NFR BLD AUTO: 0.4 %
KETONES URINE: NEGATIVE
LDLC SERPL CALC-MCNC: 102 MG/DL
LEUKOCYTE ESTERASE URINE: NEGATIVE
LYMPHOCYTES # BLD AUTO: 3.04 K/UL
LYMPHOCYTES NFR BLD AUTO: 37.2 %
MAN DIFF?: NORMAL
MCHC RBC-ENTMCNC: 31 PG
MCHC RBC-ENTMCNC: 33.6 GM/DL
MCV RBC AUTO: 92.5 FL
MONOCYTES # BLD AUTO: 0.73 K/UL
MONOCYTES NFR BLD AUTO: 8.9 %
NEUTROPHILS # BLD AUTO: 4.15 K/UL
NEUTROPHILS NFR BLD AUTO: 50.6 %
NITRITE URINE: NEGATIVE
NONHDLC SERPL-MCNC: 112 MG/DL
PH URINE: 5.5
PLATELET # BLD AUTO: 330 K/UL
POTASSIUM SERPL-SCNC: 4.2 MMOL/L
PROT SERPL-MCNC: 7.3 G/DL
PROTEIN URINE: NEGATIVE
RBC # BLD: 4.51 M/UL
RBC # FLD: 13.5 %
SARS-COV-2 AB SERPL IA-ACNC: >250 U/ML
SARS-COV-2 AB SERPL QL IA: 138 INDEX
SODIUM SERPL-SCNC: 140 MMOL/L
SPECIFIC GRAVITY URINE: 1.02
TRIGL SERPL-MCNC: 54 MG/DL
TSH SERPL-ACNC: 1.46 UIU/ML
UROBILINOGEN URINE: NORMAL
VIT B12 SERPL-MCNC: 354 PG/ML
WBC # FLD AUTO: 8.18 K/UL

## 2021-08-23 ENCOUNTER — APPOINTMENT (OUTPATIENT)
Dept: PULMONOLOGY | Facility: CLINIC | Age: 36
End: 2021-08-23
Payer: MEDICAID

## 2021-08-23 PROCEDURE — 99213 OFFICE O/P EST LOW 20 MIN: CPT | Mod: 95

## 2021-08-23 NOTE — REASON FOR VISIT
[Follow-Up] : a follow-up visit [Pulmonary Embolism] : pulmonary embolism [TextBox_44] : Post Covid pneumonia

## 2021-08-23 NOTE — HISTORY OF PRESENT ILLNESS
[Never] : never [Home] : at home, [unfilled] , at the time of the visit. [Medical Office: ([facility])___] : at the medical office located in  [TextBox_4] : Patient is a 35-year-old morbidly obese male with past medical history significant for hypertension, obstructive sleep apnea, history of COVID-19 pneumonia, history of pulmonary embolism and DVT who is seen today via telemedicine.  The patient states that his cough shortness of breath and dyspnea on exertion have improved significantly.  Currently states that he is compliant with his anticoagulation.  He denies fevers chills chest pain weight loss or hemoptysis

## 2021-08-24 ENCOUNTER — APPOINTMENT (OUTPATIENT)
Dept: INTERNAL MEDICINE | Facility: CLINIC | Age: 36
End: 2021-08-24
Payer: MEDICAID

## 2021-08-24 PROCEDURE — 99214 OFFICE O/P EST MOD 30 MIN: CPT | Mod: 95

## 2021-08-24 NOTE — HISTORY OF PRESENT ILLNESS
[Home] : at home, [unfilled] , at the time of the visit. [Medical Office: ([facility])___] : at the medical office located in  [Verbal consent obtained from patient] : the patient, [unfilled] [de-identified] : \par Nga Omar 36 yo male, with hx of HTN, s/p COVID pneumonia, DVT, PE, recently diagnosed anxiety ( while he was hospitalized for COVID ) seen in telemedicine for follow up visit\par Pt states he is feeling much better. Says his breathing is fine now. He no longer feels SOB/MARIN\par States he is walking a lot and goes up and down stairs without feeling winded\par Has stopped using the Breztri inhaler\par He has been following with pulmonary-last visit yesterday. He is still taking the Eliquis 5mg BID \par \par Says his anxiety has also gotten much better He is  following psychiatrist. and Respiradone was decreased to 0.5mg \par \par

## 2021-08-24 NOTE — ASSESSMENT
[FreeTextEntry1] : \par \par s/p COVID pneumonia,  DVT, Pulmonary Embolus \par pt states he is feeling much better. No longer feels winded\par has stopped Breztri inhaler\par He is following with pulmonary-last visit yesterday\par cont  Eliquis 5 mg BID\par \par Anxiety\par follows with therapist and psych\par Respiradone has been reduced to 0.5mg \par \par HTN\par discussed importance of following a low salt diet, weight loss, exercise\par cont Lisinopril 5 mg daily\par \par f/u in 3 months\par \par \par \par \par

## 2021-09-15 ENCOUNTER — APPOINTMENT (OUTPATIENT)
Dept: PULMONOLOGY | Facility: CLINIC | Age: 36
End: 2021-09-15
Payer: MEDICAID

## 2021-09-15 VITALS
HEIGHT: 70.5 IN | WEIGHT: 305 LBS | RESPIRATION RATE: 12 BRPM | HEART RATE: 90 BPM | SYSTOLIC BLOOD PRESSURE: 138 MMHG | OXYGEN SATURATION: 97 % | TEMPERATURE: 97.3 F | BODY MASS INDEX: 43.18 KG/M2 | DIASTOLIC BLOOD PRESSURE: 98 MMHG

## 2021-09-15 DIAGNOSIS — U09.9 POST COVID-19 CONDITION, UNSPECIFIED: ICD-10-CM

## 2021-09-15 DIAGNOSIS — I82.409 ACUTE EMBOLISM AND THROMBOSIS OF UNSPECIFIED DEEP VEINS OF UNSPECIFIED LOWER EXTREMITY: ICD-10-CM

## 2021-09-15 DIAGNOSIS — Z78.9 OTHER SPECIFIED HEALTH STATUS: ICD-10-CM

## 2021-09-15 DIAGNOSIS — I26.99 OTHER PULMONARY EMBOLISM W/OUT ACUTE COR PULMONALE: ICD-10-CM

## 2021-09-15 DIAGNOSIS — I10 ESSENTIAL (PRIMARY) HYPERTENSION: ICD-10-CM

## 2021-09-15 DIAGNOSIS — F41.9 ANXIETY DISORDER, UNSPECIFIED: ICD-10-CM

## 2021-09-15 PROCEDURE — 99214 OFFICE O/P EST MOD 30 MIN: CPT

## 2021-09-20 PROBLEM — I82.409 DVT (DEEP VENOUS THROMBOSIS): Status: ACTIVE | Noted: 2021-06-17

## 2021-09-20 PROBLEM — I10 HYPERTENSION: Status: ACTIVE | Noted: 2021-06-17

## 2021-09-20 PROBLEM — Z78.9 SOCIAL ALCOHOL USE: Status: ACTIVE | Noted: 2021-06-23

## 2021-09-20 PROBLEM — F41.9 ANXIETY: Status: ACTIVE | Noted: 2021-06-17

## 2021-09-20 NOTE — ASSESSMENT
[FreeTextEntry1] : In summary the patient is a 35-year-old male with hypertension status post COVID-19 pneumonia, s/p pulmonary emboli currently on Eliquis and lisinopril who presents for follow up\par Patient has responded well to therapy.  I had a lengthy discussion with the patient regarding continuing his anticoagulation for another month.  The patient will continue current therapy and follow-up in 1 month

## 2021-09-20 NOTE — HISTORY OF PRESENT ILLNESS
[Never] : never [TextBox_4] : Patient is a 35-year-old male with past medical history significant for hypertension status post Covid status post pulmonary emboli who presents today for follow-up.  Patient also has a history of anxiety.  He currently denies fevers chills chest pain weight loss or hemoptysis

## 2021-10-06 PROBLEM — U09.9 POST-COVID-19 SYNDROME: Status: ACTIVE | Noted: 2021-06-23

## 2021-10-11 NOTE — PATIENT PROFILE ADULT - NSPROGENSOURCEINFO_GEN_A_NUR
[Time Spent: ___ minutes] : I have spent [unfilled] minutes of time on the encounter.
[Time Spent: ___ minutes] : I have spent [unfilled] minutes of time on the encounter.
patient

## 2022-08-29 NOTE — BH CONSULTATION LIAISON PROGRESS NOTE - NSBHCHARTREVIEWVS_PSY_A_CORE FT
Patient is calling in requesting a refill on the Diclofenac Sodium. I do not see this anywhere on the med list new or old.
Vital Signs Last 24 Hrs  T(C): 36.6 (11 Jun 2021 15:19), Max: 36.7 (11 Jun 2021 05:44)  T(F): 97.9 (11 Jun 2021 15:19), Max: 98 (11 Jun 2021 05:44)  HR: 105 (11 Jun 2021 15:19) (92 - 119)  BP: 121/84 (11 Jun 2021 15:19) (121/84 - 138/88)  BP(mean): 105 (10 Adin 2021 23:00) (105 - 105)  RR: 20 (11 Jun 2021 15:19) (18 - 26)  SpO2: 94% (11 Jun 2021 15:19) (91% - 97%)
Vital Signs Last 24 Hrs  T(C): 37 (09 Jun 2021 16:20), Max: 37 (09 Jun 2021 12:08)  T(F): 98.6 (09 Jun 2021 16:20), Max: 98.6 (09 Jun 2021 12:08)  HR: 110 (09 Jun 2021 16:00) (103 - 124)  BP: 136/81 (09 Jun 2021 16:00) (109/67 - 152/94)  BP(mean): 92 (09 Jun 2021 16:00) (72 - 92)  RR: 20 (09 Jun 2021 16:00) (17 - 20)  SpO2: 95% (09 Jun 2021 16:00) (94% - 98%)
Vital Signs Last 24 Hrs  T(C): 36.8 (08 Jun 2021 10:43), Max: 37 (08 Jun 2021 04:15)  T(F): 98.2 (08 Jun 2021 10:43), Max: 98.6 (08 Jun 2021 04:15)  HR: 109 (08 Jun 2021 10:43) (88 - 109)  BP: 132/74 (08 Jun 2021 10:43) (132/74 - 147/93)  BP(mean): 106 (08 Jun 2021 04:15) (106 - 106)  RR: 18 (08 Jun 2021 10:43) (17 - 19)  SpO2: 97% (08 Jun 2021 10:43) (97% - 97%)
Vital Signs Last 24 Hrs  T(C): 36.8 (10 Adin 2021 15:43), Max: 36.8 (09 Jun 2021 20:30)  T(F): 98.2 (10 Adin 2021 15:43), Max: 98.2 (09 Jun 2021 20:30)  HR: 123 (10 Adin 2021 15:43) (105 - 128)  BP: 129/79 (10 Adin 2021 15:43) (116/77 - 158/96)  BP(mean): 94 (10 Adin 2021 12:00) (72 - 107)  RR: 20 (10 Adin 2021 15:43) (18 - 24)  SpO2: 94% (10 Adin 2021 15:43) (94% - 117%)

## 2022-09-23 ENCOUNTER — NON-APPOINTMENT (OUTPATIENT)
Age: 37
End: 2022-09-23

## 2023-08-27 ENCOUNTER — NON-APPOINTMENT (OUTPATIENT)
Age: 38
End: 2023-08-27